# Patient Record
Sex: FEMALE | Race: WHITE | NOT HISPANIC OR LATINO | URBAN - METROPOLITAN AREA
[De-identification: names, ages, dates, MRNs, and addresses within clinical notes are randomized per-mention and may not be internally consistent; named-entity substitution may affect disease eponyms.]

---

## 2018-05-01 ENCOUNTER — EMERGENCY (EMERGENCY)
Facility: HOSPITAL | Age: 73
LOS: 1 days | Discharge: HOME | End: 2018-05-01
Attending: EMERGENCY MEDICINE
Payer: COMMERCIAL

## 2018-05-01 VITALS
HEART RATE: 53 BPM | DIASTOLIC BLOOD PRESSURE: 80 MMHG | HEIGHT: 55 IN | WEIGHT: 182.98 LBS | TEMPERATURE: 98 F | SYSTOLIC BLOOD PRESSURE: 210 MMHG | RESPIRATION RATE: 16 BRPM | OXYGEN SATURATION: 96 %

## 2018-05-01 LAB
APTT BLD: 28 SEC — SIGNIFICANT CHANGE UP (ref 27.5–37.4)
INR BLD: 0.93 RATIO — SIGNIFICANT CHANGE UP (ref 0.88–1.16)
PROTHROM AB SERPL-ACNC: 10.1 SEC — SIGNIFICANT CHANGE UP (ref 9.8–12.7)

## 2018-05-01 PROCEDURE — 99284 EMERGENCY DEPT VISIT MOD MDM: CPT | Mod: 25

## 2018-05-01 PROCEDURE — 85610 PROTHROMBIN TIME: CPT

## 2018-05-01 PROCEDURE — 85027 COMPLETE CBC AUTOMATED: CPT

## 2018-05-01 PROCEDURE — 99285 EMERGENCY DEPT VISIT HI MDM: CPT | Mod: 25

## 2018-05-01 PROCEDURE — 80053 COMPREHEN METABOLIC PANEL: CPT

## 2018-05-01 PROCEDURE — 85730 THROMBOPLASTIN TIME PARTIAL: CPT

## 2018-05-01 PROCEDURE — 71045 X-RAY EXAM CHEST 1 VIEW: CPT

## 2018-05-01 PROCEDURE — 93005 ELECTROCARDIOGRAM TRACING: CPT

## 2018-05-01 PROCEDURE — 84484 ASSAY OF TROPONIN QUANT: CPT

## 2018-05-01 PROCEDURE — 71045 X-RAY EXAM CHEST 1 VIEW: CPT | Mod: 26

## 2018-05-01 RX ORDER — SODIUM CHLORIDE 9 MG/ML
3 INJECTION INTRAMUSCULAR; INTRAVENOUS; SUBCUTANEOUS ONCE
Qty: 0 | Refills: 0 | Status: COMPLETED | OUTPATIENT
Start: 2018-05-01 | End: 2018-05-01

## 2018-05-01 RX ORDER — LABETALOL HCL 100 MG
10 TABLET ORAL ONCE
Qty: 0 | Refills: 0 | Status: DISCONTINUED | OUTPATIENT
Start: 2018-05-01 | End: 2018-05-02

## 2018-05-01 RX ADMIN — SODIUM CHLORIDE 3 MILLILITER(S): 9 INJECTION INTRAMUSCULAR; INTRAVENOUS; SUBCUTANEOUS at 23:35

## 2018-05-01 NOTE — ED PROVIDER NOTE - CROS ED CARDIOVAS ALL NEG
Problem: Pain  Goal: #Acceptable pain level achieved/maintained at rest using NRS/Faces  This goal is used for patients who can self-report.  Acceptable means the level is at or below the identified comfort/function goal.   Outcome: Outcome Not Met, Plan Adjusted  Pt complains of right foot pain. Receives PRN oxycodone q4 hours       - - -

## 2018-05-01 NOTE — ED PROVIDER NOTE - CONDUCTED A DETAILED DISCUSSION WITH PATIENT AND/OR GUARDIAN REGARDING, MDM
lab results/radiology results/need for outpatient follow-up radiology results/lab results/need for outpatient follow-up/return to ED if symptoms worsen, persist or questions arise

## 2018-05-01 NOTE — ED PROVIDER NOTE - OBJECTIVE STATEMENT
74 y/o pt w/ PMHx of HTN, chronic migraines, and vertigo and PSHx of aortic valve replacement presents today with elevated BP and chest pain. Pt reports that her BP has been uncontrolled. Today, pt reported elevated BP in the morning at 220 systolic and later this evening up to 240 systolic during which she felt chest pressure and dizziness. Pt notes that she was seen by her cardiologist yesterday who performed an ECHO in office and changed Losartan to Olmesartan and Spironolactone in addition to metipranolol. However, pt was concerned because due to her change in medication, her BP became quite elevated today, so she came to the ED. Pt currently admits that her dizziness has resolved. Pt denies focal weakness, numbness, or any other complaints. MICHAELDA.

## 2018-05-01 NOTE — ED PROVIDER NOTE - PROGRESS NOTE DETAILS
labs show neg trop, CXR unremarkable  discussed above with patient. On reeval BP improved w/o intervention, pt denies recurrence of chest pain. Patient stable for discharge.

## 2018-05-01 NOTE — ED PROVIDER NOTE - MEDICAL DECISION MAKING DETAILS
74 y/o F pt w/ Hx of uncontrolled HTN, vertigo, and chronic migraines presents w/ elevated BP assoc w/ chest pain. Currently, pt asymptomatic. Will obtain EKG, labs, and CXR, and reassess. Given Labetalol for HTN.

## 2018-05-02 VITALS
SYSTOLIC BLOOD PRESSURE: 152 MMHG | DIASTOLIC BLOOD PRESSURE: 60 MMHG | RESPIRATION RATE: 16 BRPM | OXYGEN SATURATION: 100 % | TEMPERATURE: 98 F | HEART RATE: 57 BPM

## 2018-05-02 LAB
ALBUMIN SERPL ELPH-MCNC: 3.8 G/DL — SIGNIFICANT CHANGE UP (ref 3.5–5)
ALP SERPL-CCNC: 84 U/L — SIGNIFICANT CHANGE UP (ref 40–120)
ALT FLD-CCNC: 24 U/L DA — SIGNIFICANT CHANGE UP (ref 10–60)
ANION GAP SERPL CALC-SCNC: 8 MMOL/L — SIGNIFICANT CHANGE UP (ref 5–17)
AST SERPL-CCNC: 25 U/L — SIGNIFICANT CHANGE UP (ref 10–40)
BASOPHILS # BLD AUTO: 0.1 K/UL — SIGNIFICANT CHANGE UP (ref 0–0.2)
BASOPHILS NFR BLD AUTO: 1.2 % — SIGNIFICANT CHANGE UP (ref 0–2)
BILIRUB SERPL-MCNC: 0.3 MG/DL — SIGNIFICANT CHANGE UP (ref 0.2–1.2)
BUN SERPL-MCNC: 19 MG/DL — HIGH (ref 7–18)
CALCIUM SERPL-MCNC: 10.2 MG/DL — SIGNIFICANT CHANGE UP (ref 8.4–10.5)
CHLORIDE SERPL-SCNC: 109 MMOL/L — HIGH (ref 96–108)
CO2 SERPL-SCNC: 24 MMOL/L — SIGNIFICANT CHANGE UP (ref 22–31)
CREAT SERPL-MCNC: 0.67 MG/DL — SIGNIFICANT CHANGE UP (ref 0.5–1.3)
EOSINOPHIL # BLD AUTO: 0.2 K/UL — SIGNIFICANT CHANGE UP (ref 0–0.5)
EOSINOPHIL NFR BLD AUTO: 2.8 % — SIGNIFICANT CHANGE UP (ref 0–6)
GLUCOSE SERPL-MCNC: 93 MG/DL — SIGNIFICANT CHANGE UP (ref 70–99)
HCT VFR BLD CALC: 40.7 % — SIGNIFICANT CHANGE UP (ref 34.5–45)
HGB BLD-MCNC: 13 G/DL — SIGNIFICANT CHANGE UP (ref 11.5–15.5)
LYMPHOCYTES # BLD AUTO: 1.9 K/UL — SIGNIFICANT CHANGE UP (ref 1–3.3)
LYMPHOCYTES # BLD AUTO: 25.9 % — SIGNIFICANT CHANGE UP (ref 13–44)
MCHC RBC-ENTMCNC: 25.7 PG — LOW (ref 27–34)
MCHC RBC-ENTMCNC: 32 GM/DL — SIGNIFICANT CHANGE UP (ref 32–36)
MCV RBC AUTO: 80.5 FL — SIGNIFICANT CHANGE UP (ref 80–100)
MONOCYTES # BLD AUTO: 0.5 K/UL — SIGNIFICANT CHANGE UP (ref 0–0.9)
MONOCYTES NFR BLD AUTO: 7.4 % — SIGNIFICANT CHANGE UP (ref 2–14)
NEUTROPHILS # BLD AUTO: 4.7 K/UL — SIGNIFICANT CHANGE UP (ref 1.8–7.4)
NEUTROPHILS NFR BLD AUTO: 62.8 % — SIGNIFICANT CHANGE UP (ref 43–77)
PLATELET # BLD AUTO: 184 K/UL — SIGNIFICANT CHANGE UP (ref 150–400)
POTASSIUM SERPL-MCNC: 4 MMOL/L — SIGNIFICANT CHANGE UP (ref 3.5–5.3)
POTASSIUM SERPL-SCNC: 4 MMOL/L — SIGNIFICANT CHANGE UP (ref 3.5–5.3)
PROT SERPL-MCNC: 7.5 G/DL — SIGNIFICANT CHANGE UP (ref 6–8.3)
RBC # BLD: 5.05 M/UL — SIGNIFICANT CHANGE UP (ref 3.8–5.2)
RBC # FLD: 14.1 % — SIGNIFICANT CHANGE UP (ref 10.3–14.5)
SODIUM SERPL-SCNC: 141 MMOL/L — SIGNIFICANT CHANGE UP (ref 135–145)
TROPONIN I SERPL-MCNC: <0.015 NG/ML — SIGNIFICANT CHANGE UP (ref 0–0.04)
WBC # BLD: 7.4 K/UL — SIGNIFICANT CHANGE UP (ref 3.8–10.5)
WBC # FLD AUTO: 7.4 K/UL — SIGNIFICANT CHANGE UP (ref 3.8–10.5)

## 2018-05-02 NOTE — ED ADULT NURSE NOTE - OBJECTIVE STATEMENT
came to Ed due to high blood pressure today with dizziness which resolved by itself, denies any headache or dizziness at this time.

## 2020-07-18 NOTE — ED ADULT NURSE NOTE - PLAN OF CARE
[facility]

                             Created on: 2020



Romy Leida

External Reference #: [id number]

: 1992

Sex: Female



Demographics





                          Address                   [address] [place]  [address]

 

                          Preferred Language        Unknown

 

                          Marital Status            Unknown

 

                          Pentecostalism Affiliation     Unknown

 

                          Race                      Unknown

 

                          Ethnic Group              Unknown





Author





                          Author                    Leida ALBA

 

                          Organization              [facility]

 

                          Address                   [address] [place]  [address]



 

                          Phone                     [phone]







Care Team Providers





                    Care Team Member Name Role                Phone

 

                    MACRINA ALBA     Unavailable         [phone]







PROBLEMS





          Type      Condition ICD9-CM Code FKX71-WB Code Onset Dates Condition S

tatus SNOMED 

Code

 

             Problem      Encounter for insertion of intrauterine contraceptive 

device              Z30.430       

                          Active                    51009068

 

          Problem   Microcytic anemia           D50.9               Active    23

[id number]

 

          Problem   Itching             L29.9               Active    065196162

 

          Problem   Bug bites           W57.XXXA            Active    [id number]







ALLERGIES

No Information



ENCOUNTERS





                Encounter       Location        Date            Diagnosis

 

                          [facility]  [id number]

[address] [place] [address] 

[id number]                              Viral upper respiratory trac

t infection J06.9

 

                          [facility]  [id number]

[address] [place] [address] 

[id number]                31 Oct, 2018               

 

                          [facility]     [id number]

[address] [place] [address]

                          24 Oct, 2018              Prenatal care in second trim

[place] Z34.92

 

                          [facility]     [id number]

[address] [place] [address]

                          19 Oct, 2018              Microcytic anemia D50.9

 

                          [facility]     [address]

[address] [place] [address]

                          03 Oct, 2018              Microcytic anemia D50.9

 

                          [facility]     [id number]

[address] [place] [address]

                          26 Sep, 2018              Multigravida in first trimes

ter Z34.81 and Normal pregnancy in 

multigravida Z34.80

 

                          [facility]     [id number]

[address] [place] [address]

                          20 Sep, 2018               

 

                          [facility]     [address] [id number]

[address] [place] [address]

                          14 Sep, 2018               

 

                          [facility]     [address] [facility] [id number]

[address] [place] [address]

                          11 Sep, 2018               

 

                          [facility]     [address] [facility] [id number]

[address] [place] [address]

                          07 Sep, 2018              Encounter for pregnancy test

 Z32.00

 

                          [facility]  [address] [facility] [id number]

[address] [place] [address] 

[address]                              Irritant contact dermatitis 

due to other chemical 

products L24.5

 

                          [facility]     [address] [id number]

[address] [place] [address]

                                        Frequent headaches R51

 

                          [facility]  [address] [facility] [id number]

[address] [place] [address] 

[address]                              Frequent headaches R51

 

                          [facility]  [facility] N [facility] [id number]

[address] [place] [address] 

[address]                              Other viral agents as the ca

use of diseases classified 

elsewhere B97.89 and Acute upper respiratory infection, unspecified J06.9

 

                          [address] [facility] [id number]

[address] [place] [address]

                                        Pyelonephritis N12

 

                          [address] [facility] [id number]

[address] [place] [address]

                                        Leukorrhea N89.8 ; Acute vag

initis N76.0 and Other specified 

bacterial agents as the cause of diseases classified elsewhere B96.89

 

                          [facility]     [address] [id number]

[address] [place] [address]

                          29 Mar, 2016              Right ovarian cyst N83.20

 

                          [address] [place] [address] [facility] [id number]

[address] [place] [address]

                                         

 

                          [facility]     [address] [facility] [id number]

[address] [place] [address]

                                         

 

                          [address] [facility] [id number]

[address] [place] [address]

                          13 2016              Right ovarian cyst N83.20

 

                          [address]

[address] [place] [address]

                          11 2016              Encounter for insertion of i

ntrauterine contraceptive device 

Z30.430

 

                          [address] [place] [address]

                          07 2016              Encounter for counseling reg

arding contraception Z30.9

 

                          [address] [address]

                          04 2016               

 

                          [address] [address]

                          18 Dec, 2015              Well woman exam Z01.419 ; We

ight gain R63.5 and BMI 27.0-27.9,adult

Z68.27

 

                          [address] [address]

                          18 Dec, 2015              Erythema nodosum L52 and Fat

igue R53.83

 

                          [address] [address]

                          16 Dec, 2015              Erythema nodosum L52

 

                          [address] [address] [address]

                          30 2015              General counseling and advic

e for contraceptive management Z30.09 

and OCP (oral contraceptive pills) initiation Z30.011

 

                          [address] [place] [address] [address]

                          27 2015              Bug bites W57.XXXA and Itchi

ng L29.9

 

                          [address] [address]

[address] [place] [address]

                          19 2015              Well woman exam Z01.419 ; We

ight gain R63.5 and BMI 27.0-27.9,adult

Z68.27

 

                          [address] [address]

[address] [place] [address]

                          27 Oct, 2015              Ankle pain, left M25.572

 

                          [facility]   [address] [id number]

[address], [place] [id number]

                          12 May, 2015              Dental examination V72.2

 

                          [facility]   [address] [id number]

[address] [place] [id number]

                          08 May, 2015              Dental examination V72.2

 

                          [facility]     [address] [id number]

[address] [place] [address]

                                         

 

                          [facility]     [address] [id number]

[address] [place] [address]

                                         

 

                          [facility]     [address] [id number]

[address] [place] [address]

                                         

 

                          [facility]     [address] [id number]

[address] [place] [address]

                                         

 

                          [facility]     [address] [id number]

[address] [place] [address]

                                         

 

                          [facility]     [address] [id number]

[address] [place] [address]

                                         

 

                          [facility]     [address] [id number]

[address] [place] [address]

                                         

 

                          [facility]     [address] [id number]

[address] [place] [address]

                                         

 

                          [facility]     [address] [id number]

[address] [place] [address]

                          09 Oct, 2014               

 

                          [facility]     [address] [id number]

[address] [place] [address]

                          09 Oct, 2014               

 

                          [facility]     [address] [id number]

[address] [place] [address]

                          29 Sep, 2014               

 

                          [facility]     [address] [id number]

[address] [place] [address]

                          29 Sep, 2014               

 

                          [facility]     [address] [id number]

[address] [place] [address]

                          22 Sep, 2014               

 

                          [facility]     [address] [id number]

[address] [place] [address], [place] [address]

                          22 Sep, 2014               

 

                          [facility]     [address] [id number]

[address] [place] [address], [place] [address]

                          22 Aug, 2014               

 

                          [facility]     [address] [id number]

[address] [place] [address], [place] [address]

                          22 Aug, 2014               

 

                          [facility]     [address] [id number]

[address] [place] [address], [place] [address]

                          19 May, 2014               

 

                          [facility]     [address] [id number]

[address] [place] [address], [place] [address]

                          19 May, 2014               

 

                          [facility]     [address] [id number]

[address] [place] [address], [place] [address]

                          10 Apr, 2014               

 

                          [facility]     [address] [id number]

[address] [place] [address], [place] [address]

                          10 Apr, 2014               

 

                          [facility]     [address] [id number]

[address] [place] [address], [place] [address]

                                         

 

                          [facility]     [address] [id number]

[address] [place] [address], [place] [address]

                                         

 

                          [facility]     [address] [id number]

[address] [place] [address], [place] [address]

                                         

 

                          [facility]     [address] [id number]

[address] [place] [address], [place] [address]

                                         

 

                          [facility]     [address] [id number]

[address] [place] [address], [place] [address]

                          10 Feb, 2014               

 

                          [facility]     [address] [id number]

[address] [place] [address], [place] [address]

                          10 Feb, 2014               

 

                          [facility]     [address] [id number]

[address] [place] [address], [place] [address]

                                         

 

                          [facility]     [address] [id number]

[address] [place] [address], [place] [address]

                                         

 

                          [facility]     [address] [id number]

[address] [place] [address], [place] [address]

                                         

 

                          [facility]     [address] [id number]

[address] [place] [address], [place] [address]

                                         

 

                          [facility]     [address] [id number]

[address] [place] [address], [place] [address]

                                         

 

                          [facility]     [address] [id number]

[address] [place] [address], [place] [address]

                                         

 

                          [facility]     [address] [id number]

[address] [place] [address], [place] [address]

                                         

 

                          [facility]     [address] [id number]

[address] [place] [address], [place] [address]

                                         

 

                          [facility]     [address] [id number]

[address] [place] [address], [place] [address]

                                         

 

                          [facility]     [address] [id number]

[address] [place] [address], [place] [address]

                                         

 

                          [facility]     [address] [id number]

[address] [place] [address], [place] [address]

                                         

 

                          [facility]     [address] [id number]

[address] [place] [address], [place] [address]

                                         

 

                          [facility]     [address] [id number]

[address] [place] [address], [place] [address]

                          28 Mar, 2013               

 

                          [facility]     [address] [id number]

[address] [place] [address], [place] [address]

                          25 Mar, 2013               

 

                          [facility]     [address] [id number]

[address] [place] [address], [place] [address]

                          07 Mar, 2013               

 

                          [facility]     [address] [id number]

[address] [place] [address], [place] [address]

                          04 Mar, 2013               

 

                          [facility]     [address] [id number]

[address] [place] [address], [place] [address]

                          01 Mar, 2013               

 

                          [facility]     [address] [id number]

[address] [place] [address], [place] [address]

                                         

 

                          [facility]     [address] [id number]

[address] [place] [address]

                                         

 

                          [facility]     [address] [facility] [id number]

[address] [place] [address]

                                         

 

                          [facility]     [address] [facility] [id number]

[address] [place] [address]

                          25 Oct, 2012               

 

                          [facility]     [address] [facility] [id number]

[address] [place] [address]

                          25 Oct, 2012               

 

                          [facility]     [address] [facility] [id number]

[address] [place] [address]

                          03 Oct, 2012               

 

                          [facility]     [address] [facility] [id number]

[address] [place] [address]

                                         

 

                          [facility]     [address] [facility] [id number]

[address] [place] [address]

                                         







IMMUNIZATIONS

No Known Immunizations



SOCIAL HISTORY

Never Assessed



REASON FOR VISIT





PLAN OF CARE





VITAL SIGNS





MEDICATIONS

No Known Medications



RESULTS

No Results



PROCEDURES

No Known procedures



INSTRUCTIONS





MEDICATIONS ADMINISTERED

No Known Medications



MEDICAL (GENERAL) HISTORY





                    Type                Description         Date

 

                    Surgical History    No know Surgical history  

 

                    Hospitalization History childbirth          

 

                    Hospitalization History childbirth          2019 Explanation of exam/test

## 2021-01-01 ENCOUNTER — INPATIENT (INPATIENT)
Facility: HOSPITAL | Age: 76
LOS: 12 days | End: 2021-03-25
Attending: HOSPITALIST | Admitting: HOSPITALIST
Payer: MEDICARE

## 2021-01-01 VITALS
DIASTOLIC BLOOD PRESSURE: 38 MMHG | HEART RATE: 66 BPM | TEMPERATURE: 95 F | RESPIRATION RATE: 18 BRPM | OXYGEN SATURATION: 68 % | SYSTOLIC BLOOD PRESSURE: 54 MMHG

## 2021-01-01 VITALS
HEIGHT: 55 IN | SYSTOLIC BLOOD PRESSURE: 121 MMHG | DIASTOLIC BLOOD PRESSURE: 63 MMHG | TEMPERATURE: 98 F | OXYGEN SATURATION: 85 % | RESPIRATION RATE: 20 BRPM | HEART RATE: 90 BPM

## 2021-01-01 DIAGNOSIS — U07.1 COVID-19: ICD-10-CM

## 2021-01-01 DIAGNOSIS — Z71.89 OTHER SPECIFIED COUNSELING: ICD-10-CM

## 2021-01-01 DIAGNOSIS — K59.00 CONSTIPATION, UNSPECIFIED: ICD-10-CM

## 2021-01-01 DIAGNOSIS — I35.0 NONRHEUMATIC AORTIC (VALVE) STENOSIS: ICD-10-CM

## 2021-01-01 DIAGNOSIS — R60.0 LOCALIZED EDEMA: ICD-10-CM

## 2021-01-01 DIAGNOSIS — Z98.890 OTHER SPECIFIED POSTPROCEDURAL STATES: Chronic | ICD-10-CM

## 2021-01-01 DIAGNOSIS — N17.9 ACUTE KIDNEY FAILURE, UNSPECIFIED: ICD-10-CM

## 2021-01-01 DIAGNOSIS — J96.01 ACUTE RESPIRATORY FAILURE WITH HYPOXIA: ICD-10-CM

## 2021-01-01 DIAGNOSIS — Z29.9 ENCOUNTER FOR PROPHYLACTIC MEASURES, UNSPECIFIED: ICD-10-CM

## 2021-01-01 DIAGNOSIS — F32.5 MAJOR DEPRESSIVE DISORDER, SINGLE EPISODE, IN FULL REMISSION: ICD-10-CM

## 2021-01-01 DIAGNOSIS — Z51.5 ENCOUNTER FOR PALLIATIVE CARE: ICD-10-CM

## 2021-01-01 DIAGNOSIS — I10 ESSENTIAL (PRIMARY) HYPERTENSION: ICD-10-CM

## 2021-01-01 DIAGNOSIS — Z90.49 ACQUIRED ABSENCE OF OTHER SPECIFIED PARTS OF DIGESTIVE TRACT: Chronic | ICD-10-CM

## 2021-01-01 DIAGNOSIS — J96.00 ACUTE RESPIRATORY FAILURE, UNSPECIFIED WHETHER WITH HYPOXIA OR HYPERCAPNIA: ICD-10-CM

## 2021-01-01 LAB
ALBUMIN SERPL ELPH-MCNC: 2.9 G/DL — LOW (ref 3.3–5)
ALBUMIN SERPL ELPH-MCNC: 3.4 G/DL — SIGNIFICANT CHANGE UP (ref 3.3–5)
ALBUMIN SERPL ELPH-MCNC: 3.5 G/DL — SIGNIFICANT CHANGE UP (ref 3.3–5)
ALBUMIN SERPL ELPH-MCNC: 3.9 G/DL — SIGNIFICANT CHANGE UP (ref 3.3–5)
ALBUMIN SERPL ELPH-MCNC: 4.3 G/DL — SIGNIFICANT CHANGE UP (ref 3.3–5)
ALBUMIN SERPL ELPH-MCNC: 4.3 G/DL — SIGNIFICANT CHANGE UP (ref 3.3–5)
ALP SERPL-CCNC: 110 U/L — SIGNIFICANT CHANGE UP (ref 40–120)
ALP SERPL-CCNC: 64 U/L — SIGNIFICANT CHANGE UP (ref 40–120)
ALP SERPL-CCNC: 64 U/L — SIGNIFICANT CHANGE UP (ref 40–120)
ALP SERPL-CCNC: 76 U/L — SIGNIFICANT CHANGE UP (ref 40–120)
ALP SERPL-CCNC: 85 U/L — SIGNIFICANT CHANGE UP (ref 40–120)
ALP SERPL-CCNC: 92 U/L — SIGNIFICANT CHANGE UP (ref 40–120)
ALT FLD-CCNC: 11 U/L — SIGNIFICANT CHANGE UP (ref 4–33)
ALT FLD-CCNC: 11 U/L — SIGNIFICANT CHANGE UP (ref 4–33)
ALT FLD-CCNC: 13 U/L — SIGNIFICANT CHANGE UP (ref 4–33)
ALT FLD-CCNC: 17 U/L — SIGNIFICANT CHANGE UP (ref 4–33)
ALT FLD-CCNC: 17 U/L — SIGNIFICANT CHANGE UP (ref 4–33)
ALT FLD-CCNC: 18 U/L — SIGNIFICANT CHANGE UP (ref 4–33)
ANION GAP SERPL CALC-SCNC: 10 MMOL/L — SIGNIFICANT CHANGE UP (ref 7–14)
ANION GAP SERPL CALC-SCNC: 10 MMOL/L — SIGNIFICANT CHANGE UP (ref 7–14)
ANION GAP SERPL CALC-SCNC: 11 MMOL/L — SIGNIFICANT CHANGE UP (ref 7–14)
ANION GAP SERPL CALC-SCNC: 11 MMOL/L — SIGNIFICANT CHANGE UP (ref 7–14)
ANION GAP SERPL CALC-SCNC: 12 MMOL/L — SIGNIFICANT CHANGE UP (ref 7–14)
ANION GAP SERPL CALC-SCNC: 13 MMOL/L — SIGNIFICANT CHANGE UP (ref 7–14)
ANION GAP SERPL CALC-SCNC: 14 MMOL/L — SIGNIFICANT CHANGE UP (ref 7–14)
ANION GAP SERPL CALC-SCNC: 14 MMOL/L — SIGNIFICANT CHANGE UP (ref 7–14)
ANION GAP SERPL CALC-SCNC: 16 MMOL/L — HIGH (ref 7–14)
AST SERPL-CCNC: 21 U/L — SIGNIFICANT CHANGE UP (ref 4–32)
AST SERPL-CCNC: 23 U/L — SIGNIFICANT CHANGE UP (ref 4–32)
AST SERPL-CCNC: 28 U/L — SIGNIFICANT CHANGE UP (ref 4–32)
AST SERPL-CCNC: 31 U/L — SIGNIFICANT CHANGE UP (ref 4–32)
AST SERPL-CCNC: 35 U/L — HIGH (ref 4–32)
AST SERPL-CCNC: 37 U/L — HIGH (ref 4–32)
B PERT DNA SPEC QL NAA+PROBE: SIGNIFICANT CHANGE UP
BASOPHILS # BLD AUTO: 0 K/UL — SIGNIFICANT CHANGE UP (ref 0–0.2)
BASOPHILS # BLD AUTO: 0.01 K/UL — SIGNIFICANT CHANGE UP (ref 0–0.2)
BASOPHILS # BLD AUTO: 0.02 K/UL — SIGNIFICANT CHANGE UP (ref 0–0.2)
BASOPHILS NFR BLD AUTO: 0 % — SIGNIFICANT CHANGE UP (ref 0–2)
BASOPHILS NFR BLD AUTO: 0.1 % — SIGNIFICANT CHANGE UP (ref 0–2)
BASOPHILS NFR BLD AUTO: 0.2 % — SIGNIFICANT CHANGE UP (ref 0–2)
BASOPHILS NFR BLD AUTO: 0.3 % — SIGNIFICANT CHANGE UP (ref 0–2)
BASOPHILS NFR BLD AUTO: 0.3 % — SIGNIFICANT CHANGE UP (ref 0–2)
BILIRUB SERPL-MCNC: 0.2 MG/DL — SIGNIFICANT CHANGE UP (ref 0.2–1.2)
BILIRUB SERPL-MCNC: 0.3 MG/DL — SIGNIFICANT CHANGE UP (ref 0.2–1.2)
BILIRUB SERPL-MCNC: 0.6 MG/DL — SIGNIFICANT CHANGE UP (ref 0.2–1.2)
BILIRUB SERPL-MCNC: <0.2 MG/DL — SIGNIFICANT CHANGE UP (ref 0.2–1.2)
BLOOD GAS VENOUS COMPREHENSIVE RESULT: SIGNIFICANT CHANGE UP
BUN SERPL-MCNC: 17 MG/DL — SIGNIFICANT CHANGE UP (ref 7–23)
BUN SERPL-MCNC: 21 MG/DL — SIGNIFICANT CHANGE UP (ref 7–23)
BUN SERPL-MCNC: 23 MG/DL — SIGNIFICANT CHANGE UP (ref 7–23)
BUN SERPL-MCNC: 28 MG/DL — HIGH (ref 7–23)
BUN SERPL-MCNC: 29 MG/DL — HIGH (ref 7–23)
BUN SERPL-MCNC: 31 MG/DL — HIGH (ref 7–23)
BUN SERPL-MCNC: 31 MG/DL — HIGH (ref 7–23)
BUN SERPL-MCNC: 32 MG/DL — HIGH (ref 7–23)
BUN SERPL-MCNC: 46 MG/DL — HIGH (ref 7–23)
BUN SERPL-MCNC: 47 MG/DL — HIGH (ref 7–23)
BUN SERPL-MCNC: 48 MG/DL — HIGH (ref 7–23)
BUN SERPL-MCNC: 55 MG/DL — HIGH (ref 7–23)
C PNEUM DNA SPEC QL NAA+PROBE: SIGNIFICANT CHANGE UP
CALCIUM SERPL-MCNC: 10.1 MG/DL — SIGNIFICANT CHANGE UP (ref 8.4–10.5)
CALCIUM SERPL-MCNC: 10.2 MG/DL — SIGNIFICANT CHANGE UP (ref 8.4–10.5)
CALCIUM SERPL-MCNC: 10.3 MG/DL — SIGNIFICANT CHANGE UP (ref 8.4–10.5)
CALCIUM SERPL-MCNC: 10.4 MG/DL — SIGNIFICANT CHANGE UP (ref 8.4–10.5)
CALCIUM SERPL-MCNC: 9.6 MG/DL — SIGNIFICANT CHANGE UP (ref 8.4–10.5)
CALCIUM SERPL-MCNC: 9.7 MG/DL — SIGNIFICANT CHANGE UP (ref 8.4–10.5)
CALCIUM SERPL-MCNC: 9.7 MG/DL — SIGNIFICANT CHANGE UP (ref 8.4–10.5)
CALCIUM SERPL-MCNC: 9.8 MG/DL — SIGNIFICANT CHANGE UP (ref 8.4–10.5)
CALCIUM SERPL-MCNC: 9.9 MG/DL — SIGNIFICANT CHANGE UP (ref 8.4–10.5)
CALCIUM SERPL-MCNC: 9.9 MG/DL — SIGNIFICANT CHANGE UP (ref 8.4–10.5)
CHLORIDE SERPL-SCNC: 100 MMOL/L — SIGNIFICANT CHANGE UP (ref 98–107)
CHLORIDE SERPL-SCNC: 101 MMOL/L — SIGNIFICANT CHANGE UP (ref 98–107)
CHLORIDE SERPL-SCNC: 104 MMOL/L — SIGNIFICANT CHANGE UP (ref 98–107)
CHLORIDE SERPL-SCNC: 104 MMOL/L — SIGNIFICANT CHANGE UP (ref 98–107)
CHLORIDE SERPL-SCNC: 105 MMOL/L — SIGNIFICANT CHANGE UP (ref 98–107)
CHLORIDE SERPL-SCNC: 95 MMOL/L — LOW (ref 98–107)
CHLORIDE SERPL-SCNC: 99 MMOL/L — SIGNIFICANT CHANGE UP (ref 98–107)
CHLORIDE SERPL-SCNC: 99 MMOL/L — SIGNIFICANT CHANGE UP (ref 98–107)
CO2 SERPL-SCNC: 18 MMOL/L — LOW (ref 22–31)
CO2 SERPL-SCNC: 21 MMOL/L — LOW (ref 22–31)
CO2 SERPL-SCNC: 22 MMOL/L — SIGNIFICANT CHANGE UP (ref 22–31)
CO2 SERPL-SCNC: 22 MMOL/L — SIGNIFICANT CHANGE UP (ref 22–31)
CO2 SERPL-SCNC: 23 MMOL/L — SIGNIFICANT CHANGE UP (ref 22–31)
CO2 SERPL-SCNC: 24 MMOL/L — SIGNIFICANT CHANGE UP (ref 22–31)
CO2 SERPL-SCNC: 24 MMOL/L — SIGNIFICANT CHANGE UP (ref 22–31)
CO2 SERPL-SCNC: 25 MMOL/L — SIGNIFICANT CHANGE UP (ref 22–31)
CO2 SERPL-SCNC: 26 MMOL/L — SIGNIFICANT CHANGE UP (ref 22–31)
CO2 SERPL-SCNC: 27 MMOL/L — SIGNIFICANT CHANGE UP (ref 22–31)
CREAT SERPL-MCNC: 0.71 MG/DL — SIGNIFICANT CHANGE UP (ref 0.5–1.3)
CREAT SERPL-MCNC: 0.76 MG/DL — SIGNIFICANT CHANGE UP (ref 0.5–1.3)
CREAT SERPL-MCNC: 0.81 MG/DL — SIGNIFICANT CHANGE UP (ref 0.5–1.3)
CREAT SERPL-MCNC: 0.82 MG/DL — SIGNIFICANT CHANGE UP (ref 0.5–1.3)
CREAT SERPL-MCNC: 0.88 MG/DL — SIGNIFICANT CHANGE UP (ref 0.5–1.3)
CREAT SERPL-MCNC: 0.89 MG/DL — SIGNIFICANT CHANGE UP (ref 0.5–1.3)
CREAT SERPL-MCNC: 0.91 MG/DL — SIGNIFICANT CHANGE UP (ref 0.5–1.3)
CREAT SERPL-MCNC: 0.95 MG/DL — SIGNIFICANT CHANGE UP (ref 0.5–1.3)
CREAT SERPL-MCNC: 1.16 MG/DL — SIGNIFICANT CHANGE UP (ref 0.5–1.3)
CREAT SERPL-MCNC: 1.24 MG/DL — SIGNIFICANT CHANGE UP (ref 0.5–1.3)
CREAT SERPL-MCNC: 1.29 MG/DL — SIGNIFICANT CHANGE UP (ref 0.5–1.3)
CREAT SERPL-MCNC: 1.51 MG/DL — HIGH (ref 0.5–1.3)
CRP SERPL-MCNC: 12.2 MG/L — HIGH
CRP SERPL-MCNC: 13.4 MG/L — HIGH
CRP SERPL-MCNC: 20.9 MG/L — HIGH
CRP SERPL-MCNC: 59.6 MG/L — HIGH
D DIMER BLD IA.RAPID-MCNC: 1309 NG/ML DDU — HIGH
D DIMER BLD IA.RAPID-MCNC: 193 NG/ML DDU — SIGNIFICANT CHANGE UP
D DIMER BLD IA.RAPID-MCNC: 206 NG/ML DDU — SIGNIFICANT CHANGE UP
D DIMER BLD IA.RAPID-MCNC: 315 NG/ML DDU — HIGH
D DIMER BLD IA.RAPID-MCNC: 364 NG/ML DDU — HIGH
EOSINOPHIL # BLD AUTO: 0 K/UL — SIGNIFICANT CHANGE UP (ref 0–0.5)
EOSINOPHIL # BLD AUTO: 0.01 K/UL — SIGNIFICANT CHANGE UP (ref 0–0.5)
EOSINOPHIL # BLD AUTO: 0.02 K/UL — SIGNIFICANT CHANGE UP (ref 0–0.5)
EOSINOPHIL NFR BLD AUTO: 0 % — SIGNIFICANT CHANGE UP (ref 0–6)
EOSINOPHIL NFR BLD AUTO: 0.1 % — SIGNIFICANT CHANGE UP (ref 0–6)
EOSINOPHIL NFR BLD AUTO: 0.3 % — SIGNIFICANT CHANGE UP (ref 0–6)
FERRITIN SERPL-MCNC: 396 NG/ML — HIGH (ref 15–150)
FERRITIN SERPL-MCNC: 427 NG/ML — HIGH (ref 15–150)
FERRITIN SERPL-MCNC: 429 NG/ML — HIGH (ref 15–150)
FERRITIN SERPL-MCNC: 517 NG/ML — HIGH (ref 15–150)
FERRITIN SERPL-MCNC: 530 NG/ML — HIGH (ref 15–150)
FLUAV SUBTYP SPEC NAA+PROBE: SIGNIFICANT CHANGE UP
FLUBV RNA SPEC QL NAA+PROBE: SIGNIFICANT CHANGE UP
GLUCOSE BLDC GLUCOMTR-MCNC: 103 MG/DL — HIGH (ref 70–99)
GLUCOSE BLDC GLUCOMTR-MCNC: 131 MG/DL — HIGH (ref 70–99)
GLUCOSE BLDC GLUCOMTR-MCNC: 283 MG/DL — HIGH (ref 70–99)
GLUCOSE BLDC GLUCOMTR-MCNC: 79 MG/DL — SIGNIFICANT CHANGE UP (ref 70–99)
GLUCOSE BLDC GLUCOMTR-MCNC: 89 MG/DL — SIGNIFICANT CHANGE UP (ref 70–99)
GLUCOSE BLDC GLUCOMTR-MCNC: 96 MG/DL — SIGNIFICANT CHANGE UP (ref 70–99)
GLUCOSE SERPL-MCNC: 109 MG/DL — HIGH (ref 70–99)
GLUCOSE SERPL-MCNC: 122 MG/DL — HIGH (ref 70–99)
GLUCOSE SERPL-MCNC: 128 MG/DL — HIGH (ref 70–99)
GLUCOSE SERPL-MCNC: 144 MG/DL — HIGH (ref 70–99)
GLUCOSE SERPL-MCNC: 185 MG/DL — HIGH (ref 70–99)
GLUCOSE SERPL-MCNC: 81 MG/DL — SIGNIFICANT CHANGE UP (ref 70–99)
GLUCOSE SERPL-MCNC: 81 MG/DL — SIGNIFICANT CHANGE UP (ref 70–99)
GLUCOSE SERPL-MCNC: 86 MG/DL — SIGNIFICANT CHANGE UP (ref 70–99)
GLUCOSE SERPL-MCNC: 88 MG/DL — SIGNIFICANT CHANGE UP (ref 70–99)
GLUCOSE SERPL-MCNC: 96 MG/DL — SIGNIFICANT CHANGE UP (ref 70–99)
GLUCOSE SERPL-MCNC: 97 MG/DL — SIGNIFICANT CHANGE UP (ref 70–99)
GLUCOSE SERPL-MCNC: 99 MG/DL — SIGNIFICANT CHANGE UP (ref 70–99)
HADV DNA SPEC QL NAA+PROBE: SIGNIFICANT CHANGE UP
HCOV 229E RNA SPEC QL NAA+PROBE: SIGNIFICANT CHANGE UP
HCOV HKU1 RNA SPEC QL NAA+PROBE: SIGNIFICANT CHANGE UP
HCOV NL63 RNA SPEC QL NAA+PROBE: SIGNIFICANT CHANGE UP
HCOV OC43 RNA SPEC QL NAA+PROBE: SIGNIFICANT CHANGE UP
HCT VFR BLD CALC: 40 % — SIGNIFICANT CHANGE UP (ref 34.5–45)
HCT VFR BLD CALC: 40 % — SIGNIFICANT CHANGE UP (ref 34.5–45)
HCT VFR BLD CALC: 40.4 % — SIGNIFICANT CHANGE UP (ref 34.5–45)
HCT VFR BLD CALC: 40.7 % — SIGNIFICANT CHANGE UP (ref 34.5–45)
HCT VFR BLD CALC: 40.7 % — SIGNIFICANT CHANGE UP (ref 34.5–45)
HCT VFR BLD CALC: 42 % — SIGNIFICANT CHANGE UP (ref 34.5–45)
HCT VFR BLD CALC: 42.6 % — SIGNIFICANT CHANGE UP (ref 34.5–45)
HCT VFR BLD CALC: 42.9 % — SIGNIFICANT CHANGE UP (ref 34.5–45)
HCT VFR BLD CALC: 43.2 % — SIGNIFICANT CHANGE UP (ref 34.5–45)
HCT VFR BLD CALC: 43.5 % — SIGNIFICANT CHANGE UP (ref 34.5–45)
HGB BLD-MCNC: 12 G/DL — SIGNIFICANT CHANGE UP (ref 11.5–15.5)
HGB BLD-MCNC: 12.1 G/DL — SIGNIFICANT CHANGE UP (ref 11.5–15.5)
HGB BLD-MCNC: 12.3 G/DL — SIGNIFICANT CHANGE UP (ref 11.5–15.5)
HGB BLD-MCNC: 12.3 G/DL — SIGNIFICANT CHANGE UP (ref 11.5–15.5)
HGB BLD-MCNC: 12.5 G/DL — SIGNIFICANT CHANGE UP (ref 11.5–15.5)
HGB BLD-MCNC: 12.7 G/DL — SIGNIFICANT CHANGE UP (ref 11.5–15.5)
HGB BLD-MCNC: 12.8 G/DL — SIGNIFICANT CHANGE UP (ref 11.5–15.5)
HGB BLD-MCNC: 13 G/DL — SIGNIFICANT CHANGE UP (ref 11.5–15.5)
HMPV RNA SPEC QL NAA+PROBE: SIGNIFICANT CHANGE UP
HPIV1 RNA SPEC QL NAA+PROBE: SIGNIFICANT CHANGE UP
HPIV2 RNA SPEC QL NAA+PROBE: SIGNIFICANT CHANGE UP
HPIV3 RNA SPEC QL NAA+PROBE: SIGNIFICANT CHANGE UP
HPIV4 RNA SPEC QL NAA+PROBE: SIGNIFICANT CHANGE UP
IANC: 2.35 K/UL — SIGNIFICANT CHANGE UP (ref 1.5–8.5)
IANC: 3.29 K/UL — SIGNIFICANT CHANGE UP (ref 1.5–8.5)
IANC: 3.84 K/UL — SIGNIFICANT CHANGE UP (ref 1.5–8.5)
IANC: 5.63 K/UL — SIGNIFICANT CHANGE UP (ref 1.5–8.5)
IANC: 5.84 K/UL — SIGNIFICANT CHANGE UP (ref 1.5–8.5)
IMM GRANULOCYTES NFR BLD AUTO: 0.4 % — SIGNIFICANT CHANGE UP (ref 0–1.5)
IMM GRANULOCYTES NFR BLD AUTO: 0.7 % — SIGNIFICANT CHANGE UP (ref 0–1.5)
IMM GRANULOCYTES NFR BLD AUTO: 1 % — SIGNIFICANT CHANGE UP (ref 0–1.5)
IMM GRANULOCYTES NFR BLD AUTO: 1.1 % — SIGNIFICANT CHANGE UP (ref 0–1.5)
IMM GRANULOCYTES NFR BLD AUTO: 3.3 % — HIGH (ref 0–1.5)
LDH SERPL L TO P-CCNC: 359 U/L — HIGH (ref 135–225)
LDH SERPL L TO P-CCNC: 410 U/L — HIGH (ref 135–225)
LDH SERPL L TO P-CCNC: 417 U/L — HIGH (ref 135–225)
LYMPHOCYTES # BLD AUTO: 0.53 K/UL — LOW (ref 1–3.3)
LYMPHOCYTES # BLD AUTO: 0.53 K/UL — LOW (ref 1–3.3)
LYMPHOCYTES # BLD AUTO: 0.57 K/UL — LOW (ref 1–3.3)
LYMPHOCYTES # BLD AUTO: 0.58 K/UL — LOW (ref 1–3.3)
LYMPHOCYTES # BLD AUTO: 0.75 K/UL — LOW (ref 1–3.3)
LYMPHOCYTES # BLD AUTO: 13.6 % — SIGNIFICANT CHANGE UP (ref 13–44)
LYMPHOCYTES # BLD AUTO: 15.5 % — SIGNIFICANT CHANGE UP (ref 13–44)
LYMPHOCYTES # BLD AUTO: 16.4 % — SIGNIFICANT CHANGE UP (ref 13–44)
LYMPHOCYTES # BLD AUTO: 7.4 % — LOW (ref 13–44)
LYMPHOCYTES # BLD AUTO: 7.7 % — LOW (ref 13–44)
MAGNESIUM SERPL-MCNC: 1.8 MG/DL — SIGNIFICANT CHANGE UP (ref 1.6–2.6)
MAGNESIUM SERPL-MCNC: 1.9 MG/DL — SIGNIFICANT CHANGE UP (ref 1.6–2.6)
MAGNESIUM SERPL-MCNC: 2 MG/DL — SIGNIFICANT CHANGE UP (ref 1.6–2.6)
MAGNESIUM SERPL-MCNC: 2.1 MG/DL — SIGNIFICANT CHANGE UP (ref 1.6–2.6)
MAGNESIUM SERPL-MCNC: 2.1 MG/DL — SIGNIFICANT CHANGE UP (ref 1.6–2.6)
MAGNESIUM SERPL-MCNC: 2.2 MG/DL — SIGNIFICANT CHANGE UP (ref 1.6–2.6)
MAGNESIUM SERPL-MCNC: 2.2 MG/DL — SIGNIFICANT CHANGE UP (ref 1.6–2.6)
MAGNESIUM SERPL-MCNC: 2.4 MG/DL — SIGNIFICANT CHANGE UP (ref 1.6–2.6)
MCHC RBC-ENTMCNC: 23.3 PG — LOW (ref 27–34)
MCHC RBC-ENTMCNC: 23.7 PG — LOW (ref 27–34)
MCHC RBC-ENTMCNC: 23.8 PG — LOW (ref 27–34)
MCHC RBC-ENTMCNC: 23.9 PG — LOW (ref 27–34)
MCHC RBC-ENTMCNC: 24 PG — LOW (ref 27–34)
MCHC RBC-ENTMCNC: 24.1 PG — LOW (ref 27–34)
MCHC RBC-ENTMCNC: 29.8 GM/DL — LOW (ref 32–36)
MCHC RBC-ENTMCNC: 29.9 GM/DL — LOW (ref 32–36)
MCHC RBC-ENTMCNC: 30 GM/DL — LOW (ref 32–36)
MCHC RBC-ENTMCNC: 30.1 GM/DL — LOW (ref 32–36)
MCHC RBC-ENTMCNC: 30.2 GM/DL — LOW (ref 32–36)
MCHC RBC-ENTMCNC: 30.3 GM/DL — LOW (ref 32–36)
MCHC RBC-ENTMCNC: 30.3 GM/DL — LOW (ref 32–36)
MCHC RBC-ENTMCNC: 30.4 GM/DL — LOW (ref 32–36)
MCHC RBC-ENTMCNC: 30.5 GM/DL — LOW (ref 32–36)
MCHC RBC-ENTMCNC: 30.7 GM/DL — LOW (ref 32–36)
MCV RBC AUTO: 77.6 FL — LOW (ref 80–100)
MCV RBC AUTO: 78 FL — LOW (ref 80–100)
MCV RBC AUTO: 78.3 FL — LOW (ref 80–100)
MCV RBC AUTO: 78.4 FL — LOW (ref 80–100)
MCV RBC AUTO: 78.7 FL — LOW (ref 80–100)
MCV RBC AUTO: 78.9 FL — LOW (ref 80–100)
MCV RBC AUTO: 79.1 FL — LOW (ref 80–100)
MCV RBC AUTO: 79.6 FL — LOW (ref 80–100)
MCV RBC AUTO: 79.6 FL — LOW (ref 80–100)
MCV RBC AUTO: 79.7 FL — LOW (ref 80–100)
MONOCYTES # BLD AUTO: 0.21 K/UL — SIGNIFICANT CHANGE UP (ref 0–0.9)
MONOCYTES # BLD AUTO: 0.29 K/UL — SIGNIFICANT CHANGE UP (ref 0–0.9)
MONOCYTES # BLD AUTO: 0.55 K/UL — SIGNIFICANT CHANGE UP (ref 0–0.9)
MONOCYTES # BLD AUTO: 0.55 K/UL — SIGNIFICANT CHANGE UP (ref 0–0.9)
MONOCYTES # BLD AUTO: 0.64 K/UL — SIGNIFICANT CHANGE UP (ref 0–0.9)
MONOCYTES NFR BLD AUTO: 15.6 % — HIGH (ref 2–14)
MONOCYTES NFR BLD AUTO: 4.3 % — SIGNIFICANT CHANGE UP (ref 2–14)
MONOCYTES NFR BLD AUTO: 6.9 % — SIGNIFICANT CHANGE UP (ref 2–14)
MONOCYTES NFR BLD AUTO: 7.6 % — SIGNIFICANT CHANGE UP (ref 2–14)
MONOCYTES NFR BLD AUTO: 9.3 % — SIGNIFICANT CHANGE UP (ref 2–14)
NEUTROPHILS # BLD AUTO: 2.35 K/UL — SIGNIFICANT CHANGE UP (ref 1.8–7.4)
NEUTROPHILS # BLD AUTO: 3.29 K/UL — SIGNIFICANT CHANGE UP (ref 1.8–7.4)
NEUTROPHILS # BLD AUTO: 3.84 K/UL — SIGNIFICANT CHANGE UP (ref 1.8–7.4)
NEUTROPHILS # BLD AUTO: 5.63 K/UL — SIGNIFICANT CHANGE UP (ref 1.8–7.4)
NEUTROPHILS # BLD AUTO: 5.84 K/UL — SIGNIFICANT CHANGE UP (ref 1.8–7.4)
NEUTROPHILS NFR BLD AUTO: 66.6 % — SIGNIFICANT CHANGE UP (ref 43–77)
NEUTROPHILS NFR BLD AUTO: 78.8 % — HIGH (ref 43–77)
NEUTROPHILS NFR BLD AUTO: 79.6 % — HIGH (ref 43–77)
NEUTROPHILS NFR BLD AUTO: 81.1 % — HIGH (ref 43–77)
NEUTROPHILS NFR BLD AUTO: 81.8 % — HIGH (ref 43–77)
NRBC # BLD: 0 /100 WBCS — SIGNIFICANT CHANGE UP
NRBC # FLD: 0 K/UL — SIGNIFICANT CHANGE UP
PHOSPHATE SERPL-MCNC: 2.3 MG/DL — LOW (ref 2.5–4.5)
PHOSPHATE SERPL-MCNC: 2.4 MG/DL — LOW (ref 2.5–4.5)
PHOSPHATE SERPL-MCNC: 2.7 MG/DL — SIGNIFICANT CHANGE UP (ref 2.5–4.5)
PHOSPHATE SERPL-MCNC: 2.8 MG/DL — SIGNIFICANT CHANGE UP (ref 2.5–4.5)
PHOSPHATE SERPL-MCNC: 2.9 MG/DL — SIGNIFICANT CHANGE UP (ref 2.5–4.5)
PHOSPHATE SERPL-MCNC: 2.9 MG/DL — SIGNIFICANT CHANGE UP (ref 2.5–4.5)
PHOSPHATE SERPL-MCNC: 3.4 MG/DL — SIGNIFICANT CHANGE UP (ref 2.5–4.5)
PHOSPHATE SERPL-MCNC: 3.8 MG/DL — SIGNIFICANT CHANGE UP (ref 2.5–4.5)
PLATELET # BLD AUTO: 171 K/UL — SIGNIFICANT CHANGE UP (ref 150–400)
PLATELET # BLD AUTO: 204 K/UL — SIGNIFICANT CHANGE UP (ref 150–400)
PLATELET # BLD AUTO: 223 K/UL — SIGNIFICANT CHANGE UP (ref 150–400)
PLATELET # BLD AUTO: 223 K/UL — SIGNIFICANT CHANGE UP (ref 150–400)
PLATELET # BLD AUTO: 242 K/UL — SIGNIFICANT CHANGE UP (ref 150–400)
PLATELET # BLD AUTO: 245 K/UL — SIGNIFICANT CHANGE UP (ref 150–400)
PLATELET # BLD AUTO: 248 K/UL — SIGNIFICANT CHANGE UP (ref 150–400)
PLATELET # BLD AUTO: 249 K/UL — SIGNIFICANT CHANGE UP (ref 150–400)
PLATELET # BLD AUTO: 251 K/UL — SIGNIFICANT CHANGE UP (ref 150–400)
PLATELET # BLD AUTO: 261 K/UL — SIGNIFICANT CHANGE UP (ref 150–400)
POTASSIUM SERPL-MCNC: 3.8 MMOL/L — SIGNIFICANT CHANGE UP (ref 3.5–5.3)
POTASSIUM SERPL-MCNC: 3.8 MMOL/L — SIGNIFICANT CHANGE UP (ref 3.5–5.3)
POTASSIUM SERPL-MCNC: 4.1 MMOL/L — SIGNIFICANT CHANGE UP (ref 3.5–5.3)
POTASSIUM SERPL-MCNC: 4.2 MMOL/L — SIGNIFICANT CHANGE UP (ref 3.5–5.3)
POTASSIUM SERPL-MCNC: 4.6 MMOL/L — SIGNIFICANT CHANGE UP (ref 3.5–5.3)
POTASSIUM SERPL-MCNC: 4.6 MMOL/L — SIGNIFICANT CHANGE UP (ref 3.5–5.3)
POTASSIUM SERPL-MCNC: 4.7 MMOL/L — SIGNIFICANT CHANGE UP (ref 3.5–5.3)
POTASSIUM SERPL-MCNC: 4.9 MMOL/L — SIGNIFICANT CHANGE UP (ref 3.5–5.3)
POTASSIUM SERPL-MCNC: 5 MMOL/L — SIGNIFICANT CHANGE UP (ref 3.5–5.3)
POTASSIUM SERPL-MCNC: 5.1 MMOL/L — SIGNIFICANT CHANGE UP (ref 3.5–5.3)
POTASSIUM SERPL-SCNC: 3.8 MMOL/L — SIGNIFICANT CHANGE UP (ref 3.5–5.3)
POTASSIUM SERPL-SCNC: 3.8 MMOL/L — SIGNIFICANT CHANGE UP (ref 3.5–5.3)
POTASSIUM SERPL-SCNC: 4.1 MMOL/L — SIGNIFICANT CHANGE UP (ref 3.5–5.3)
POTASSIUM SERPL-SCNC: 4.2 MMOL/L — SIGNIFICANT CHANGE UP (ref 3.5–5.3)
POTASSIUM SERPL-SCNC: 4.6 MMOL/L — SIGNIFICANT CHANGE UP (ref 3.5–5.3)
POTASSIUM SERPL-SCNC: 4.6 MMOL/L — SIGNIFICANT CHANGE UP (ref 3.5–5.3)
POTASSIUM SERPL-SCNC: 4.7 MMOL/L — SIGNIFICANT CHANGE UP (ref 3.5–5.3)
POTASSIUM SERPL-SCNC: 4.9 MMOL/L — SIGNIFICANT CHANGE UP (ref 3.5–5.3)
POTASSIUM SERPL-SCNC: 5 MMOL/L — SIGNIFICANT CHANGE UP (ref 3.5–5.3)
POTASSIUM SERPL-SCNC: 5.1 MMOL/L — SIGNIFICANT CHANGE UP (ref 3.5–5.3)
PROCALCITONIN SERPL-MCNC: 0.06 NG/ML — SIGNIFICANT CHANGE UP (ref 0.02–0.1)
PROCALCITONIN SERPL-MCNC: 0.1 NG/ML — SIGNIFICANT CHANGE UP (ref 0.02–0.1)
PROCALCITONIN SERPL-MCNC: 0.13 NG/ML — HIGH (ref 0.02–0.1)
PROT SERPL-MCNC: 6.4 G/DL — SIGNIFICANT CHANGE UP (ref 6–8.3)
PROT SERPL-MCNC: 6.5 G/DL — SIGNIFICANT CHANGE UP (ref 6–8.3)
PROT SERPL-MCNC: 6.7 G/DL — SIGNIFICANT CHANGE UP (ref 6–8.3)
PROT SERPL-MCNC: 7.4 G/DL — SIGNIFICANT CHANGE UP (ref 6–8.3)
PROT SERPL-MCNC: 7.8 G/DL — SIGNIFICANT CHANGE UP (ref 6–8.3)
PROT SERPL-MCNC: 7.8 G/DL — SIGNIFICANT CHANGE UP (ref 6–8.3)
RAPID RVP RESULT: DETECTED
RBC # BLD: 5.06 M/UL — SIGNIFICANT CHANGE UP (ref 3.8–5.2)
RBC # BLD: 5.11 M/UL — SIGNIFICANT CHANGE UP (ref 3.8–5.2)
RBC # BLD: 5.11 M/UL — SIGNIFICANT CHANGE UP (ref 3.8–5.2)
RBC # BLD: 5.15 M/UL — SIGNIFICANT CHANGE UP (ref 3.8–5.2)
RBC # BLD: 5.22 M/UL — HIGH (ref 3.8–5.2)
RBC # BLD: 5.34 M/UL — HIGH (ref 3.8–5.2)
RBC # BLD: 5.35 M/UL — HIGH (ref 3.8–5.2)
RBC # BLD: 5.44 M/UL — HIGH (ref 3.8–5.2)
RBC # BLD: 5.46 M/UL — HIGH (ref 3.8–5.2)
RBC # BLD: 5.57 M/UL — HIGH (ref 3.8–5.2)
RBC # FLD: 15.4 % — HIGH (ref 10.3–14.5)
RBC # FLD: 15.6 % — HIGH (ref 10.3–14.5)
RBC # FLD: 15.7 % — HIGH (ref 10.3–14.5)
RBC # FLD: 15.8 % — HIGH (ref 10.3–14.5)
RBC # FLD: 15.9 % — HIGH (ref 10.3–14.5)
RBC # FLD: 16.3 % — HIGH (ref 10.3–14.5)
RSV RNA SPEC QL NAA+PROBE: SIGNIFICANT CHANGE UP
RV+EV RNA SPEC QL NAA+PROBE: SIGNIFICANT CHANGE UP
SARS-COV-2 RNA SPEC QL NAA+PROBE: DETECTED
SARS-COV-2 RNA SPEC QL NAA+PROBE: DETECTED
SODIUM SERPL-SCNC: 134 MMOL/L — LOW (ref 135–145)
SODIUM SERPL-SCNC: 135 MMOL/L — SIGNIFICANT CHANGE UP (ref 135–145)
SODIUM SERPL-SCNC: 135 MMOL/L — SIGNIFICANT CHANGE UP (ref 135–145)
SODIUM SERPL-SCNC: 136 MMOL/L — SIGNIFICANT CHANGE UP (ref 135–145)
SODIUM SERPL-SCNC: 136 MMOL/L — SIGNIFICANT CHANGE UP (ref 135–145)
SODIUM SERPL-SCNC: 137 MMOL/L — SIGNIFICANT CHANGE UP (ref 135–145)
SODIUM SERPL-SCNC: 138 MMOL/L — SIGNIFICANT CHANGE UP (ref 135–145)
SODIUM SERPL-SCNC: 138 MMOL/L — SIGNIFICANT CHANGE UP (ref 135–145)
SODIUM SERPL-SCNC: 139 MMOL/L — SIGNIFICANT CHANGE UP (ref 135–145)
SODIUM SERPL-SCNC: 141 MMOL/L — SIGNIFICANT CHANGE UP (ref 135–145)
TROPONIN T, HIGH SENSITIVITY RESULT: 23 NG/L — SIGNIFICANT CHANGE UP
TROPONIN T, HIGH SENSITIVITY RESULT: 24 NG/L — SIGNIFICANT CHANGE UP
WBC # BLD: 10.45 K/UL — SIGNIFICANT CHANGE UP (ref 3.8–10.5)
WBC # BLD: 11.3 K/UL — HIGH (ref 3.8–10.5)
WBC # BLD: 11.77 K/UL — HIGH (ref 3.8–10.5)
WBC # BLD: 3.53 K/UL — LOW (ref 3.8–10.5)
WBC # BLD: 4.18 K/UL — SIGNIFICANT CHANGE UP (ref 3.8–10.5)
WBC # BLD: 4.83 K/UL — SIGNIFICANT CHANGE UP (ref 3.8–10.5)
WBC # BLD: 6.43 K/UL — SIGNIFICANT CHANGE UP (ref 3.8–10.5)
WBC # BLD: 6.89 K/UL — SIGNIFICANT CHANGE UP (ref 3.8–10.5)
WBC # BLD: 7.2 K/UL — SIGNIFICANT CHANGE UP (ref 3.8–10.5)
WBC # BLD: 8.32 K/UL — SIGNIFICANT CHANGE UP (ref 3.8–10.5)
WBC # FLD AUTO: 10.45 K/UL — SIGNIFICANT CHANGE UP (ref 3.8–10.5)
WBC # FLD AUTO: 11.3 K/UL — HIGH (ref 3.8–10.5)
WBC # FLD AUTO: 11.77 K/UL — HIGH (ref 3.8–10.5)
WBC # FLD AUTO: 3.53 K/UL — LOW (ref 3.8–10.5)
WBC # FLD AUTO: 4.18 K/UL — SIGNIFICANT CHANGE UP (ref 3.8–10.5)
WBC # FLD AUTO: 4.83 K/UL — SIGNIFICANT CHANGE UP (ref 3.8–10.5)
WBC # FLD AUTO: 6.43 K/UL — SIGNIFICANT CHANGE UP (ref 3.8–10.5)
WBC # FLD AUTO: 6.89 K/UL — SIGNIFICANT CHANGE UP (ref 3.8–10.5)
WBC # FLD AUTO: 7.2 K/UL — SIGNIFICANT CHANGE UP (ref 3.8–10.5)
WBC # FLD AUTO: 8.32 K/UL — SIGNIFICANT CHANGE UP (ref 3.8–10.5)

## 2021-01-01 PROCEDURE — 99233 SBSQ HOSP IP/OBS HIGH 50: CPT | Mod: GC,25

## 2021-01-01 PROCEDURE — 99497 ADVNCD CARE PLAN 30 MIN: CPT | Mod: 25

## 2021-01-01 PROCEDURE — 93970 EXTREMITY STUDY: CPT | Mod: 26

## 2021-01-01 PROCEDURE — 99233 SBSQ HOSP IP/OBS HIGH 50: CPT

## 2021-01-01 PROCEDURE — 99231 SBSQ HOSP IP/OBS SF/LOW 25: CPT

## 2021-01-01 PROCEDURE — 99233 SBSQ HOSP IP/OBS HIGH 50: CPT | Mod: GC

## 2021-01-01 PROCEDURE — 73030 X-RAY EXAM OF SHOULDER: CPT | Mod: 26,RT

## 2021-01-01 PROCEDURE — 99285 EMERGENCY DEPT VISIT HI MDM: CPT

## 2021-01-01 PROCEDURE — 71045 X-RAY EXAM CHEST 1 VIEW: CPT | Mod: 26

## 2021-01-01 PROCEDURE — 99358 PROLONG SERVICE W/O CONTACT: CPT

## 2021-01-01 PROCEDURE — 71275 CT ANGIOGRAPHY CHEST: CPT | Mod: 26

## 2021-01-01 PROCEDURE — 99223 1ST HOSP IP/OBS HIGH 75: CPT

## 2021-01-01 PROCEDURE — 99223 1ST HOSP IP/OBS HIGH 75: CPT | Mod: GC

## 2021-01-01 RX ORDER — QUETIAPINE FUMARATE 200 MG/1
1 TABLET, FILM COATED ORAL
Qty: 0 | Refills: 0 | DISCHARGE

## 2021-01-01 RX ORDER — REMDESIVIR 5 MG/ML
100 INJECTION INTRAVENOUS EVERY 24 HOURS
Refills: 0 | Status: COMPLETED | OUTPATIENT
Start: 2021-01-01 | End: 2021-01-01

## 2021-01-01 RX ORDER — VALSARTAN 80 MG/1
320 TABLET ORAL DAILY
Refills: 0 | Status: DISCONTINUED | OUTPATIENT
Start: 2021-01-01 | End: 2021-01-01

## 2021-01-01 RX ORDER — OXYCODONE HYDROCHLORIDE 5 MG/1
5 TABLET ORAL EVERY 6 HOURS
Refills: 0 | Status: DISCONTINUED | OUTPATIENT
Start: 2021-01-01 | End: 2021-01-01

## 2021-01-01 RX ORDER — CITALOPRAM 10 MG/1
1 TABLET, FILM COATED ORAL
Qty: 0 | Refills: 0 | DISCHARGE

## 2021-01-01 RX ORDER — METOPROLOL TARTRATE 50 MG
1 TABLET ORAL
Qty: 0 | Refills: 0 | DISCHARGE

## 2021-01-01 RX ORDER — OXYCODONE HYDROCHLORIDE 5 MG/1
2.5 TABLET ORAL EVERY 6 HOURS
Refills: 0 | Status: DISCONTINUED | OUTPATIENT
Start: 2021-01-01 | End: 2021-01-01

## 2021-01-01 RX ORDER — ENOXAPARIN SODIUM 100 MG/ML
85 INJECTION SUBCUTANEOUS EVERY 12 HOURS
Refills: 0 | Status: DISCONTINUED | OUTPATIENT
Start: 2021-01-01 | End: 2021-01-01

## 2021-01-01 RX ORDER — FUROSEMIDE 40 MG
1 TABLET ORAL
Qty: 0 | Refills: 0 | DISCHARGE

## 2021-01-01 RX ORDER — ENOXAPARIN SODIUM 100 MG/ML
40 INJECTION SUBCUTANEOUS
Refills: 0 | Status: DISCONTINUED | OUTPATIENT
Start: 2021-01-01 | End: 2021-01-01

## 2021-01-01 RX ORDER — FUROSEMIDE 40 MG
40 TABLET ORAL ONCE
Refills: 0 | Status: DISCONTINUED | OUTPATIENT
Start: 2021-01-01 | End: 2021-01-01

## 2021-01-01 RX ORDER — METOPROLOL TARTRATE 50 MG
50 TABLET ORAL DAILY
Refills: 0 | Status: DISCONTINUED | OUTPATIENT
Start: 2021-01-01 | End: 2021-01-01

## 2021-01-01 RX ORDER — POLYETHYLENE GLYCOL 3350 17 G/17G
17 POWDER, FOR SOLUTION ORAL
Refills: 0 | Status: DISCONTINUED | OUTPATIENT
Start: 2021-01-01 | End: 2021-01-01

## 2021-01-01 RX ORDER — SODIUM CHLORIDE 0.65 %
1 AEROSOL, SPRAY (ML) NASAL EVERY 8 HOURS
Refills: 0 | Status: DISCONTINUED | OUTPATIENT
Start: 2021-01-01 | End: 2021-01-01

## 2021-01-01 RX ORDER — POLYETHYLENE GLYCOL 3350 17 G/17G
17 POWDER, FOR SOLUTION ORAL DAILY
Refills: 0 | Status: DISCONTINUED | OUTPATIENT
Start: 2021-01-01 | End: 2021-01-01

## 2021-01-01 RX ORDER — SODIUM CHLORIDE 9 MG/ML
1000 INJECTION INTRAMUSCULAR; INTRAVENOUS; SUBCUTANEOUS
Refills: 0 | Status: DISCONTINUED | OUTPATIENT
Start: 2021-01-01 | End: 2021-01-01

## 2021-01-01 RX ORDER — ENOXAPARIN SODIUM 100 MG/ML
45 INJECTION SUBCUTANEOUS ONCE
Refills: 0 | Status: DISCONTINUED | OUTPATIENT
Start: 2021-01-01 | End: 2021-01-01

## 2021-01-01 RX ORDER — FUROSEMIDE 40 MG
40 TABLET ORAL DAILY
Refills: 0 | Status: DISCONTINUED | OUTPATIENT
Start: 2021-01-01 | End: 2021-01-01

## 2021-01-01 RX ORDER — ALPRAZOLAM 0.25 MG
1 TABLET ORAL
Qty: 0 | Refills: 0 | DISCHARGE

## 2021-01-01 RX ORDER — ROBINUL 0.2 MG/ML
0.2 INJECTION INTRAMUSCULAR; INTRAVENOUS ONCE
Refills: 0 | Status: COMPLETED | OUTPATIENT
Start: 2021-01-01 | End: 2021-01-01

## 2021-01-01 RX ORDER — MORPHINE SULFATE 50 MG/1
2 CAPSULE, EXTENDED RELEASE ORAL
Refills: 0 | Status: DISCONTINUED | OUTPATIENT
Start: 2021-01-01 | End: 2021-01-01

## 2021-01-01 RX ORDER — ALPRAZOLAM 0.25 MG
0.25 TABLET ORAL THREE TIMES A DAY
Refills: 0 | Status: DISCONTINUED | OUTPATIENT
Start: 2021-01-01 | End: 2021-01-01

## 2021-01-01 RX ORDER — ASPIRIN/CALCIUM CARB/MAGNESIUM 324 MG
81 TABLET ORAL DAILY
Refills: 0 | Status: DISCONTINUED | OUTPATIENT
Start: 2021-01-01 | End: 2021-01-01

## 2021-01-01 RX ORDER — ROSUVASTATIN CALCIUM 5 MG/1
1 TABLET ORAL
Qty: 0 | Refills: 0 | DISCHARGE

## 2021-01-01 RX ORDER — CITALOPRAM 10 MG/1
20 TABLET, FILM COATED ORAL AT BEDTIME
Refills: 0 | Status: DISCONTINUED | OUTPATIENT
Start: 2021-01-01 | End: 2021-01-01

## 2021-01-01 RX ORDER — ONDANSETRON 8 MG/1
4 TABLET, FILM COATED ORAL ONCE
Refills: 0 | Status: COMPLETED | OUTPATIENT
Start: 2021-01-01 | End: 2021-01-01

## 2021-01-01 RX ORDER — SENNA PLUS 8.6 MG/1
2 TABLET ORAL AT BEDTIME
Refills: 0 | Status: DISCONTINUED | OUTPATIENT
Start: 2021-01-01 | End: 2021-01-01

## 2021-01-01 RX ORDER — ENOXAPARIN SODIUM 100 MG/ML
65 INJECTION SUBCUTANEOUS EVERY 12 HOURS
Refills: 0 | Status: DISCONTINUED | OUTPATIENT
Start: 2021-01-01 | End: 2021-01-01

## 2021-01-01 RX ORDER — DEXAMETHASONE 0.5 MG/5ML
6 ELIXIR ORAL DAILY
Refills: 0 | Status: COMPLETED | OUTPATIENT
Start: 2021-01-01 | End: 2021-01-01

## 2021-01-01 RX ORDER — QUETIAPINE FUMARATE 200 MG/1
100 TABLET, FILM COATED ORAL AT BEDTIME
Refills: 0 | Status: DISCONTINUED | OUTPATIENT
Start: 2021-01-01 | End: 2021-01-01

## 2021-01-01 RX ORDER — ASPIRIN/CALCIUM CARB/MAGNESIUM 324 MG
1 TABLET ORAL
Qty: 0 | Refills: 0 | DISCHARGE

## 2021-01-01 RX ORDER — ACETAMINOPHEN 500 MG
650 TABLET ORAL EVERY 6 HOURS
Refills: 0 | Status: DISCONTINUED | OUTPATIENT
Start: 2021-01-01 | End: 2021-01-01

## 2021-01-01 RX ORDER — REMDESIVIR 5 MG/ML
200 INJECTION INTRAVENOUS EVERY 24 HOURS
Refills: 0 | Status: COMPLETED | OUTPATIENT
Start: 2021-01-01 | End: 2021-01-01

## 2021-01-01 RX ORDER — REMDESIVIR 5 MG/ML
INJECTION INTRAVENOUS
Refills: 0 | Status: COMPLETED | OUTPATIENT
Start: 2021-01-01 | End: 2021-01-01

## 2021-01-01 RX ORDER — DEXAMETHASONE 0.5 MG/5ML
6 ELIXIR ORAL ONCE
Refills: 0 | Status: COMPLETED | OUTPATIENT
Start: 2021-01-01 | End: 2021-01-01

## 2021-01-01 RX ORDER — ENOXAPARIN SODIUM 100 MG/ML
25 INJECTION SUBCUTANEOUS ONCE
Refills: 0 | Status: DISCONTINUED | OUTPATIENT
Start: 2021-01-01 | End: 2021-01-01

## 2021-01-01 RX ORDER — MORPHINE SULFATE 50 MG/1
4 CAPSULE, EXTENDED RELEASE ORAL
Qty: 100 | Refills: 0 | Status: DISCONTINUED | OUTPATIENT
Start: 2021-01-01 | End: 2021-01-01

## 2021-01-01 RX ADMIN — POLYETHYLENE GLYCOL 3350 17 GRAM(S): 17 POWDER, FOR SOLUTION ORAL at 00:02

## 2021-01-01 RX ADMIN — ENOXAPARIN SODIUM 40 MILLIGRAM(S): 100 INJECTION SUBCUTANEOUS at 18:46

## 2021-01-01 RX ADMIN — ENOXAPARIN SODIUM 40 MILLIGRAM(S): 100 INJECTION SUBCUTANEOUS at 05:49

## 2021-01-01 RX ADMIN — SENNA PLUS 2 TABLET(S): 8.6 TABLET ORAL at 21:59

## 2021-01-01 RX ADMIN — ENOXAPARIN SODIUM 40 MILLIGRAM(S): 100 INJECTION SUBCUTANEOUS at 18:48

## 2021-01-01 RX ADMIN — Medication 81 MILLIGRAM(S): at 12:00

## 2021-01-01 RX ADMIN — SENNA PLUS 2 TABLET(S): 8.6 TABLET ORAL at 22:36

## 2021-01-01 RX ADMIN — QUETIAPINE FUMARATE 100 MILLIGRAM(S): 200 TABLET, FILM COATED ORAL at 21:47

## 2021-01-01 RX ADMIN — Medication 63.75 MILLIMOLE(S): at 10:16

## 2021-01-01 RX ADMIN — ENOXAPARIN SODIUM 40 MILLIGRAM(S): 100 INJECTION SUBCUTANEOUS at 06:13

## 2021-01-01 RX ADMIN — Medication 50 MILLIGRAM(S): at 05:32

## 2021-01-01 RX ADMIN — Medication 650 MILLIGRAM(S): at 15:14

## 2021-01-01 RX ADMIN — CITALOPRAM 20 MILLIGRAM(S): 10 TABLET, FILM COATED ORAL at 22:47

## 2021-01-01 RX ADMIN — POLYETHYLENE GLYCOL 3350 17 GRAM(S): 17 POWDER, FOR SOLUTION ORAL at 18:48

## 2021-01-01 RX ADMIN — Medication 50 MILLIGRAM(S): at 05:52

## 2021-01-01 RX ADMIN — Medication 40 MILLIGRAM(S): at 05:49

## 2021-01-01 RX ADMIN — Medication 1 MILLIGRAM(S): at 14:30

## 2021-01-01 RX ADMIN — Medication 0.25 MILLIGRAM(S): at 16:42

## 2021-01-01 RX ADMIN — REMDESIVIR 500 MILLIGRAM(S): 5 INJECTION INTRAVENOUS at 02:09

## 2021-01-01 RX ADMIN — VALSARTAN 320 MILLIGRAM(S): 80 TABLET ORAL at 21:58

## 2021-01-01 RX ADMIN — Medication 650 MILLIGRAM(S): at 17:53

## 2021-01-01 RX ADMIN — Medication 650 MILLIGRAM(S): at 18:50

## 2021-01-01 RX ADMIN — Medication 0.25 MILLIGRAM(S): at 21:38

## 2021-01-01 RX ADMIN — Medication 0.25 MILLIGRAM(S): at 14:06

## 2021-01-01 RX ADMIN — Medication 50 MILLIGRAM(S): at 05:12

## 2021-01-01 RX ADMIN — Medication 100 MILLIGRAM(S): at 12:30

## 2021-01-01 RX ADMIN — Medication 650 MILLIGRAM(S): at 12:30

## 2021-01-01 RX ADMIN — Medication 0.25 MILLIGRAM(S): at 14:58

## 2021-01-01 RX ADMIN — CITALOPRAM 20 MILLIGRAM(S): 10 TABLET, FILM COATED ORAL at 21:47

## 2021-01-01 RX ADMIN — ENOXAPARIN SODIUM 40 MILLIGRAM(S): 100 INJECTION SUBCUTANEOUS at 05:10

## 2021-01-01 RX ADMIN — MORPHINE SULFATE 2 MILLIGRAM(S): 50 CAPSULE, EXTENDED RELEASE ORAL at 15:00

## 2021-01-01 RX ADMIN — Medication 50 MILLIGRAM(S): at 06:31

## 2021-01-01 RX ADMIN — REMDESIVIR 500 MILLIGRAM(S): 5 INJECTION INTRAVENOUS at 00:14

## 2021-01-01 RX ADMIN — CITALOPRAM 20 MILLIGRAM(S): 10 TABLET, FILM COATED ORAL at 21:58

## 2021-01-01 RX ADMIN — SENNA PLUS 2 TABLET(S): 8.6 TABLET ORAL at 22:48

## 2021-01-01 RX ADMIN — ENOXAPARIN SODIUM 40 MILLIGRAM(S): 100 INJECTION SUBCUTANEOUS at 16:03

## 2021-01-01 RX ADMIN — CITALOPRAM 20 MILLIGRAM(S): 10 TABLET, FILM COATED ORAL at 21:32

## 2021-01-01 RX ADMIN — REMDESIVIR 500 MILLIGRAM(S): 5 INJECTION INTRAVENOUS at 02:04

## 2021-01-01 RX ADMIN — Medication 1 SPRAY(S): at 14:25

## 2021-01-01 RX ADMIN — ENOXAPARIN SODIUM 40 MILLIGRAM(S): 100 INJECTION SUBCUTANEOUS at 05:42

## 2021-01-01 RX ADMIN — Medication 6 MILLIGRAM(S): at 15:35

## 2021-01-01 RX ADMIN — Medication 0.25 MILLIGRAM(S): at 14:51

## 2021-01-01 RX ADMIN — QUETIAPINE FUMARATE 100 MILLIGRAM(S): 200 TABLET, FILM COATED ORAL at 21:58

## 2021-01-01 RX ADMIN — QUETIAPINE FUMARATE 100 MILLIGRAM(S): 200 TABLET, FILM COATED ORAL at 21:32

## 2021-01-01 RX ADMIN — Medication 10 MILLIGRAM(S): at 00:02

## 2021-01-01 RX ADMIN — Medication 650 MILLIGRAM(S): at 16:45

## 2021-01-01 RX ADMIN — CITALOPRAM 20 MILLIGRAM(S): 10 TABLET, FILM COATED ORAL at 21:57

## 2021-01-01 RX ADMIN — Medication 6 MILLIGRAM(S): at 05:57

## 2021-01-01 RX ADMIN — Medication 0.25 MILLIGRAM(S): at 05:10

## 2021-01-01 RX ADMIN — Medication 81 MILLIGRAM(S): at 12:56

## 2021-01-01 RX ADMIN — Medication 0.25 MILLIGRAM(S): at 07:07

## 2021-01-01 RX ADMIN — Medication 81 MILLIGRAM(S): at 13:07

## 2021-01-01 RX ADMIN — Medication 0.25 MILLIGRAM(S): at 21:23

## 2021-01-01 RX ADMIN — SENNA PLUS 2 TABLET(S): 8.6 TABLET ORAL at 21:53

## 2021-01-01 RX ADMIN — MORPHINE SULFATE 2 MILLIGRAM(S): 50 CAPSULE, EXTENDED RELEASE ORAL at 15:52

## 2021-01-01 RX ADMIN — POLYETHYLENE GLYCOL 3350 17 GRAM(S): 17 POWDER, FOR SOLUTION ORAL at 06:39

## 2021-01-01 RX ADMIN — Medication 650 MILLIGRAM(S): at 12:00

## 2021-01-01 RX ADMIN — ENOXAPARIN SODIUM 40 MILLIGRAM(S): 100 INJECTION SUBCUTANEOUS at 17:38

## 2021-01-01 RX ADMIN — Medication 50 MILLIGRAM(S): at 07:07

## 2021-01-01 RX ADMIN — Medication 0.25 MILLIGRAM(S): at 21:52

## 2021-01-01 RX ADMIN — Medication 0.25 MILLIGRAM(S): at 22:36

## 2021-01-01 RX ADMIN — Medication 81 MILLIGRAM(S): at 14:58

## 2021-01-01 RX ADMIN — ENOXAPARIN SODIUM 40 MILLIGRAM(S): 100 INJECTION SUBCUTANEOUS at 06:39

## 2021-01-01 RX ADMIN — VALSARTAN 320 MILLIGRAM(S): 80 TABLET ORAL at 05:42

## 2021-01-01 RX ADMIN — Medication 650 MILLIGRAM(S): at 18:00

## 2021-01-01 RX ADMIN — VALSARTAN 320 MILLIGRAM(S): 80 TABLET ORAL at 05:53

## 2021-01-01 RX ADMIN — OXYCODONE HYDROCHLORIDE 5 MILLIGRAM(S): 5 TABLET ORAL at 21:58

## 2021-01-01 RX ADMIN — CITALOPRAM 20 MILLIGRAM(S): 10 TABLET, FILM COATED ORAL at 22:48

## 2021-01-01 RX ADMIN — Medication 10 MILLIGRAM(S): at 22:47

## 2021-01-01 RX ADMIN — Medication 6 MILLIGRAM(S): at 06:32

## 2021-01-01 RX ADMIN — ENOXAPARIN SODIUM 40 MILLIGRAM(S): 100 INJECTION SUBCUTANEOUS at 05:39

## 2021-01-01 RX ADMIN — REMDESIVIR 500 MILLIGRAM(S): 5 INJECTION INTRAVENOUS at 02:38

## 2021-01-01 RX ADMIN — Medication 6 MILLIGRAM(S): at 05:10

## 2021-01-01 RX ADMIN — ENOXAPARIN SODIUM 40 MILLIGRAM(S): 100 INJECTION SUBCUTANEOUS at 16:52

## 2021-01-01 RX ADMIN — Medication 50 MILLIGRAM(S): at 05:39

## 2021-01-01 RX ADMIN — Medication 1 SPRAY(S): at 06:46

## 2021-01-01 RX ADMIN — POLYETHYLENE GLYCOL 3350 17 GRAM(S): 17 POWDER, FOR SOLUTION ORAL at 05:13

## 2021-01-01 RX ADMIN — QUETIAPINE FUMARATE 100 MILLIGRAM(S): 200 TABLET, FILM COATED ORAL at 22:48

## 2021-01-01 RX ADMIN — OXYCODONE HYDROCHLORIDE 5 MILLIGRAM(S): 5 TABLET ORAL at 04:03

## 2021-01-01 RX ADMIN — VALSARTAN 320 MILLIGRAM(S): 80 TABLET ORAL at 05:28

## 2021-01-01 RX ADMIN — Medication 81 MILLIGRAM(S): at 11:45

## 2021-01-01 RX ADMIN — QUETIAPINE FUMARATE 100 MILLIGRAM(S): 200 TABLET, FILM COATED ORAL at 21:38

## 2021-01-01 RX ADMIN — Medication 0.25 MILLIGRAM(S): at 06:45

## 2021-01-01 RX ADMIN — SENNA PLUS 2 TABLET(S): 8.6 TABLET ORAL at 21:58

## 2021-01-01 RX ADMIN — ENOXAPARIN SODIUM 40 MILLIGRAM(S): 100 INJECTION SUBCUTANEOUS at 18:00

## 2021-01-01 RX ADMIN — Medication 50 MILLIGRAM(S): at 05:56

## 2021-01-01 RX ADMIN — ENOXAPARIN SODIUM 40 MILLIGRAM(S): 100 INJECTION SUBCUTANEOUS at 18:41

## 2021-01-01 RX ADMIN — VALSARTAN 320 MILLIGRAM(S): 80 TABLET ORAL at 05:12

## 2021-01-01 RX ADMIN — VALSARTAN 320 MILLIGRAM(S): 80 TABLET ORAL at 06:39

## 2021-01-01 RX ADMIN — ENOXAPARIN SODIUM 40 MILLIGRAM(S): 100 INJECTION SUBCUTANEOUS at 18:06

## 2021-01-01 RX ADMIN — ENOXAPARIN SODIUM 40 MILLIGRAM(S): 100 INJECTION SUBCUTANEOUS at 07:26

## 2021-01-01 RX ADMIN — Medication 40 MILLIGRAM(S): at 05:10

## 2021-01-01 RX ADMIN — Medication 650 MILLIGRAM(S): at 00:30

## 2021-01-01 RX ADMIN — Medication 1 MILLIGRAM(S): at 15:29

## 2021-01-01 RX ADMIN — Medication 0.25 MILLIGRAM(S): at 07:32

## 2021-01-01 RX ADMIN — POLYETHYLENE GLYCOL 3350 17 GRAM(S): 17 POWDER, FOR SOLUTION ORAL at 07:26

## 2021-01-01 RX ADMIN — ENOXAPARIN SODIUM 40 MILLIGRAM(S): 100 INJECTION SUBCUTANEOUS at 05:33

## 2021-01-01 RX ADMIN — REMDESIVIR 500 MILLIGRAM(S): 5 INJECTION INTRAVENOUS at 01:58

## 2021-01-01 RX ADMIN — Medication 50 MILLIGRAM(S): at 05:42

## 2021-01-01 RX ADMIN — QUETIAPINE FUMARATE 100 MILLIGRAM(S): 200 TABLET, FILM COATED ORAL at 21:23

## 2021-01-01 RX ADMIN — Medication 0.25 MILLIGRAM(S): at 21:47

## 2021-01-01 RX ADMIN — Medication 650 MILLIGRAM(S): at 21:27

## 2021-01-01 RX ADMIN — CITALOPRAM 20 MILLIGRAM(S): 10 TABLET, FILM COATED ORAL at 21:52

## 2021-01-01 RX ADMIN — Medication 6 MILLIGRAM(S): at 05:13

## 2021-01-01 RX ADMIN — POLYETHYLENE GLYCOL 3350 17 GRAM(S): 17 POWDER, FOR SOLUTION ORAL at 17:44

## 2021-01-01 RX ADMIN — Medication 650 MILLIGRAM(S): at 14:19

## 2021-01-01 RX ADMIN — ROBINUL 0.2 MILLIGRAM(S): 0.2 INJECTION INTRAMUSCULAR; INTRAVENOUS at 15:07

## 2021-01-01 RX ADMIN — ENOXAPARIN SODIUM 40 MILLIGRAM(S): 100 INJECTION SUBCUTANEOUS at 05:56

## 2021-01-01 RX ADMIN — POLYETHYLENE GLYCOL 3350 17 GRAM(S): 17 POWDER, FOR SOLUTION ORAL at 17:38

## 2021-01-01 RX ADMIN — MORPHINE SULFATE 4 MG/HR: 50 CAPSULE, EXTENDED RELEASE ORAL at 18:11

## 2021-01-01 RX ADMIN — Medication 650 MILLIGRAM(S): at 19:37

## 2021-01-01 RX ADMIN — Medication 650 MILLIGRAM(S): at 13:15

## 2021-01-01 RX ADMIN — SENNA PLUS 2 TABLET(S): 8.6 TABLET ORAL at 21:32

## 2021-01-01 RX ADMIN — Medication 0.25 MILLIGRAM(S): at 05:56

## 2021-01-01 RX ADMIN — SODIUM CHLORIDE 75 MILLILITER(S): 9 INJECTION INTRAMUSCULAR; INTRAVENOUS; SUBCUTANEOUS at 18:47

## 2021-01-01 RX ADMIN — Medication 50 MILLIGRAM(S): at 07:26

## 2021-01-01 RX ADMIN — Medication 650 MILLIGRAM(S): at 23:37

## 2021-01-01 RX ADMIN — Medication 650 MILLIGRAM(S): at 16:51

## 2021-01-01 RX ADMIN — Medication 0.25 MILLIGRAM(S): at 21:56

## 2021-01-01 RX ADMIN — Medication 6 MILLIGRAM(S): at 05:39

## 2021-01-01 RX ADMIN — Medication 81 MILLIGRAM(S): at 14:06

## 2021-01-01 RX ADMIN — CITALOPRAM 20 MILLIGRAM(S): 10 TABLET, FILM COATED ORAL at 22:36

## 2021-01-01 RX ADMIN — Medication 6 MILLIGRAM(S): at 05:35

## 2021-01-01 RX ADMIN — Medication 50 MILLIGRAM(S): at 06:13

## 2021-01-01 RX ADMIN — Medication 0.25 MILLIGRAM(S): at 21:58

## 2021-01-01 RX ADMIN — Medication 50 MILLIGRAM(S): at 06:39

## 2021-01-01 RX ADMIN — Medication 6 MILLIGRAM(S): at 07:07

## 2021-01-01 RX ADMIN — SODIUM CHLORIDE 100 MILLILITER(S): 9 INJECTION INTRAMUSCULAR; INTRAVENOUS; SUBCUTANEOUS at 11:19

## 2021-01-01 RX ADMIN — ONDANSETRON 4 MILLIGRAM(S): 8 TABLET, FILM COATED ORAL at 05:10

## 2021-01-01 RX ADMIN — Medication 0.25 MILLIGRAM(S): at 05:47

## 2021-01-01 RX ADMIN — OXYCODONE HYDROCHLORIDE 5 MILLIGRAM(S): 5 TABLET ORAL at 02:09

## 2021-01-01 RX ADMIN — VALSARTAN 320 MILLIGRAM(S): 80 TABLET ORAL at 07:25

## 2021-01-01 RX ADMIN — ENOXAPARIN SODIUM 40 MILLIGRAM(S): 100 INJECTION SUBCUTANEOUS at 17:44

## 2021-01-01 RX ADMIN — MORPHINE SULFATE 2 MILLIGRAM(S): 50 CAPSULE, EXTENDED RELEASE ORAL at 15:14

## 2021-01-01 RX ADMIN — CITALOPRAM 20 MILLIGRAM(S): 10 TABLET, FILM COATED ORAL at 21:38

## 2021-01-01 RX ADMIN — Medication 650 MILLIGRAM(S): at 18:17

## 2021-01-01 RX ADMIN — MORPHINE SULFATE 2 MILLIGRAM(S): 50 CAPSULE, EXTENDED RELEASE ORAL at 14:30

## 2021-01-01 RX ADMIN — Medication 100 MILLIGRAM(S): at 19:37

## 2021-01-01 RX ADMIN — Medication 50 MILLIGRAM(S): at 05:10

## 2021-01-01 RX ADMIN — SENNA PLUS 2 TABLET(S): 8.6 TABLET ORAL at 21:38

## 2021-01-01 RX ADMIN — QUETIAPINE FUMARATE 100 MILLIGRAM(S): 200 TABLET, FILM COATED ORAL at 21:52

## 2021-01-01 RX ADMIN — Medication 0.25 MILLIGRAM(S): at 06:31

## 2021-01-01 RX ADMIN — MORPHINE SULFATE 4 MG/HR: 50 CAPSULE, EXTENDED RELEASE ORAL at 18:30

## 2021-01-01 RX ADMIN — Medication 81 MILLIGRAM(S): at 12:45

## 2021-01-01 RX ADMIN — QUETIAPINE FUMARATE 100 MILLIGRAM(S): 200 TABLET, FILM COATED ORAL at 21:56

## 2021-01-01 RX ADMIN — ENOXAPARIN SODIUM 40 MILLIGRAM(S): 100 INJECTION SUBCUTANEOUS at 06:31

## 2021-01-01 RX ADMIN — Medication 650 MILLIGRAM(S): at 16:01

## 2021-01-01 RX ADMIN — ENOXAPARIN SODIUM 40 MILLIGRAM(S): 100 INJECTION SUBCUTANEOUS at 18:14

## 2021-01-01 RX ADMIN — Medication 650 MILLIGRAM(S): at 23:29

## 2021-01-01 RX ADMIN — Medication 81 MILLIGRAM(S): at 12:07

## 2021-01-01 RX ADMIN — Medication 63.75 MILLIMOLE(S): at 13:07

## 2021-01-01 RX ADMIN — Medication 0.25 MILLIGRAM(S): at 16:02

## 2021-01-01 RX ADMIN — Medication 6 MILLIGRAM(S): at 06:12

## 2021-01-01 RX ADMIN — Medication 6 MILLIGRAM(S): at 05:51

## 2021-01-01 RX ADMIN — QUETIAPINE FUMARATE 100 MILLIGRAM(S): 200 TABLET, FILM COATED ORAL at 22:36

## 2021-01-01 RX ADMIN — VALSARTAN 320 MILLIGRAM(S): 80 TABLET ORAL at 06:31

## 2021-01-01 RX ADMIN — Medication 81 MILLIGRAM(S): at 16:52

## 2021-01-01 RX ADMIN — POLYETHYLENE GLYCOL 3350 17 GRAM(S): 17 POWDER, FOR SOLUTION ORAL at 05:56

## 2021-01-01 RX ADMIN — POLYETHYLENE GLYCOL 3350 17 GRAM(S): 17 POWDER, FOR SOLUTION ORAL at 18:41

## 2021-01-01 RX ADMIN — SENNA PLUS 2 TABLET(S): 8.6 TABLET ORAL at 21:47

## 2021-01-01 RX ADMIN — POLYETHYLENE GLYCOL 3350 17 GRAM(S): 17 POWDER, FOR SOLUTION ORAL at 05:42

## 2021-01-01 RX ADMIN — Medication 81 MILLIGRAM(S): at 14:07

## 2021-01-01 RX ADMIN — OXYCODONE HYDROCHLORIDE 5 MILLIGRAM(S): 5 TABLET ORAL at 20:28

## 2021-01-01 RX ADMIN — Medication 0.25 MILLIGRAM(S): at 10:46

## 2021-01-01 RX ADMIN — ENOXAPARIN SODIUM 40 MILLIGRAM(S): 100 INJECTION SUBCUTANEOUS at 07:10

## 2021-01-01 RX ADMIN — VALSARTAN 320 MILLIGRAM(S): 80 TABLET ORAL at 05:56

## 2021-01-01 RX ADMIN — Medication 81 MILLIGRAM(S): at 12:27

## 2021-01-01 RX ADMIN — CITALOPRAM 20 MILLIGRAM(S): 10 TABLET, FILM COATED ORAL at 21:23

## 2021-01-01 RX ADMIN — Medication 100 MILLIGRAM(S): at 13:07

## 2021-01-01 RX ADMIN — Medication 0.25 MILLIGRAM(S): at 19:55

## 2021-03-12 NOTE — ED PROVIDER NOTE - PHYSICAL EXAMINATION
Attending/Buster: Mild tachypnea; PERRL/EOMI, non-icterus, supple, no VICKEY, no JVD, RRR, mild rhonchi; Abd-soft, NT/ND, no HSM; no LE edema, A&Ox3, nonfocal; Skin-warm/dry  Triage POx 85% on RA; POx 100% on 4 L NC

## 2021-03-12 NOTE — H&P ADULT - HISTORY OF PRESENT ILLNESS
76 year old female [place] speaking, pmhx HTN,hyperlipidemia aortic stenosis s/p aortic valve replacement , MDD, lower leg edema, hx of DVT, obesity    presented due to progressive SOB,non productive cough , myalgia and nausea over 1 week duration. she tested positive for COVID-19 at outpatient urgent care 3/12 was sent to the ER due to hypoxia noted to be on room air 85% in the ER.   was started on supplemental oxygen 4L/min saturation 95%  chest xray with bilateral infiltrates and cardiomegaly    she denies fever or chills, abdominal pain or dysuria , has chronic lower extremity edema  76 year old female [place] speaking, pmhx HTN,hyperlipidemia aortic stenosis s/p aortic valve replacement , MDD, lower leg edema, hx of DVT, obesity    presented due to progressive SOB,non productive cough , myalgia and nausea over 1 week duration. she tested positive for COVID-19 at outpatient urgent care 3/12 was sent to the ER due to hypoxia noted to be on room air 85% in the ER.  known sick contact her daughter , grandson, daughter.  was started on supplemental oxygen 4L/min saturation 95% , bp 121/63mmHg, hr 93 bpm, temp 98.4F  chest xray with bilateral infiltrates and cardiomegaly    she denies fever or chills, abdominal pain or dysuria , has chronic lower extremity edema   ekg sinus rythm with sinus arrythmia, LBB rate 76bpm, QTC 490ms.

## 2021-03-12 NOTE — ED ADULT NURSE NOTE - OBJECTIVE STATEMENT
Pt presents from doctors office for increasing shortness of breath and was told to come in for admission. Pt has been having fevers, shortness of breath, and body aches/pains. Pt denies chest pain, lightheadedness and dizziness. pt sinus rhythm/sinus tachycardia on the monitor. breathing even but labored. Pt placed on 4L NC with an O2 sat of 95%. Pt abd soft and nontender. Pt denies abd pain nausea/vomiting/diarrhea. Skin clean dry and intact. Bilateral lower extremities noted to have swelling. 20g IVL placed in the L and R arms. Will continue to monitor. MD at bedside evaluating patient.

## 2021-03-12 NOTE — ED ADULT TRIAGE NOTE - CHIEF COMPLAINT QUOTE
Pt presents to ED ambulatory from urgent care with c/o worsening shortness of breath and cough x 1 week. Pt tested positive for Covid today. Pt has hx of HTN and heart disease. Pt denies fever, chest pain, abdominal pain, N/V/D. Pt found to be saturating 85% on room air, placed on 4LPM NC now saturating 95%

## 2021-03-12 NOTE — H&P ADULT - NSHPLABSRESULTS_GEN_ALL_CORE
.  LABS:                         12.1   4.83  )-----------( 171      ( 12 Mar 2021 13:17 )             40.0     03-12    134<L>  |  100  |  31<H>  ----------------------------<  128<H>  4.7   |  21<L>  |  1.16    Ca    9.7      12 Mar 2021 13:17    TPro  7.8  /  Alb  4.3  /  TBili  0.2  /  DBili  x   /  AST  35<H>  /  ALT  18  /  AlkPhos  92  03-12                  RADIOLOGY, EKG & ADDITIONAL TESTS: Reviewed.

## 2021-03-12 NOTE — ED PROVIDER NOTE - RESPIRATORY, MLM
rhonchi at the bases, tachypneic but able to speak in full sentences. O2 s rhonchi at the bases, tachypneic but able to speak in full sentences. O2 sat 85% at triage, 99% on 4L NC.

## 2021-03-12 NOTE — ED PROVIDER NOTE - CLINICAL SUMMARY MEDICAL DECISION MAKING FREE TEXT BOX
A/P 77 yo F p/w COVID-related symptoms  -EKG, labs, CXR, Decadron, admit A/P 77 yo F p/w COVID-related symptoms  -EKG, labs, CXR, Decadron, admit    75yo F Hx HTN, AVR presenting with complaints of shortness of breath, myalgias, nonproductive cough over the past 2 weeks. hypoxic at urgent care sent for admission. 85% O2 at triage, improved on 4L NC. will obtaike lab, cxr, ekg, decadron and admit.

## 2021-03-12 NOTE — H&P ADULT - ASSESSMENT
76 year old female Faroese speaking, pmhx HTN,hyperlipidemia, CKD, proteinuria  hx of renal biopsy, aortic stenosis s/p aortic valve porcine replacement 10 years , MDD, lower leg edema, obesity  Dr. Mauro cardiologist.     doug ryan  [phone]      presented due to progressive SOB,non productive cough , myalgia and nausea over 1 week duration. she tested positive for COVID-19 at outpatient urgent care 3/12 was sent to the ER due to hypoxia noted to be on room air 85% in the ER.   was started on supplemental oxygen 4L/min saturation 95% , bp 121/63mmHg, hr 93 bpm, temp 98.4F  chest xray with bilateral infiltrates and cardiomegaly      acute respiratory failure with hypoxia  due to covid pneumonia         plan:  admit to medical floor   started on remdesivir and decadron   monitor inflammatory markers  DVt prophylaxis lovenox 40mg daily     continue home medications   81mg aspirin  metoprolol 50xl daily   seroqual 100mg at night   [id number] 12.5mg once daily   crestor 20mg once daily   alprazolam 0.25mg q6hrs prn, uses only once a daily   lasix 40mg once daily   citalopram 20mg tablet  QHS   meclizine 12.5mg prn dizziness       76 year old female Turkmen speaking, pmhx HTN,hyperlipidemia, CKD, proteinuria  hx of renal biopsy, aortic stenosis s/p aortic valve porcine replacement 10 years , MDD, lower leg edema, obesity  Dr. Mauro cardiologist.     doug ryan  [phone]      presented due to progressive SOB,non productive cough , myalgia and nausea over 1 week duration. she tested positive for COVID-19 at outpatient urgent care 3/12 was sent to the ER due to hypoxia noted to be on room air 85% in the ER.   was started on supplemental oxygen 4L/min saturation 95% , bp 121/63mmHg, hr 93 bpm, temp 98.4F  chest xray with bilateral infiltrates and cardiomegaly      acute respiratory failure with hypoxia  due to covid pneumonia         plan:  admit to medical floor   started on remdesivir and decadron   monitor inflammatory markers  DVt prophylaxis lovenox 40mg daily     continue home medications   81mg aspirin  metoprolol 50xl daily   seroqual 100mg at night   [id number]/, hold HCTZ 12.5mg once daily   crestor 20mg once daily   alprazolam 0.25mg q6hrs prn, uses only once a daily   lasix 40mg once daily   citalopram 20mg tablet  QHS   meclizine 12.5mg prn dizziness

## 2021-03-12 NOTE — H&P ADULT - NSHPPHYSICALEXAM_GEN_ALL_CORE
.  VITAL SIGNS:  T(F): 98.4 (03-12-21 @ 12:33), Max: 98.4 (03-12-21 @ 12:33)  HR: 89 (03-12-21 @ 16:02) (89 - 98)  BP: 157/70 (03-12-21 @ 16:02) (121/63 - 157/70)  BP(mean): --  RR: 28 (03-12-21 @ 16:02) (20 - 28)  SpO2: 99% (03-12-21 @ 16:02) (85% - 99%)    PHYSICAL EXAM:    Constitutional: WDWN resting comfortably in bed; NAD  HEENT: NC/AT, PERRL, EOMI, anicteric sclera, no nasal discharge; uvula midline, no oropharyngeal erythema or exudates; MMM  Neck: supple; no JVD or thyromegaly  Respiratory: diminsihed air entry bilateral, no wheezes  Cardiac: +S1/S2; RRR   Gastrointestinal: soft, NT/ND; no rebound or guarding; +BSx4  Extremities: WWP, no clubbing or cyanosis; peripheral edema +1  Musculoskeletal: NROM x4; no joint swelling, tenderness or erythema  Vascular: 2+ radial, DP/PT pulses B/L  Dermatologic: skin warm, dry and intact; no rashes, wounds, or scars  Lymphatic: no submandibular or cervical LAD  Neurologic: AAOx3; CNII-XII grossly intact; no focal deficits  Psychiatric: affect and characteristics of appearance, verbalizations, behaviors are appropriate, denies SI/HI/AH/VH

## 2021-03-12 NOTE — ED PROVIDER NOTE - OBJECTIVE STATEMENT
Attending/Buster: 77 yo F h/o AVR (?type of valve), HTN, p/w ~one week of nonproductive MA, myalgias, and nausea. Seen and evaluated at an urgent car center today, positive OVID test and referred to the emergency department. Attending/Buster: 75 yo F h/o AVR (?type of valve), HTN, p/w ~one week of nonproductive MA, myalgias, and nausea. Seen and evaluated at an urgent car center today, positive OVID test and referred to the emergency department.    dora smithca pgy2: 75 yo F Hx of AVR, HTN presenting with complaints of flu like symptoms. has had one week of nonproductive cough, progressively worsening sob, myalgias and nausea. no fevers, chills, chest pain, abd pain, vomiting. was seen at an urgent care today and was found to be covid+ with O2 saturations in the 80s. sent to the ED for admission.

## 2021-03-12 NOTE — ED ADULT NURSE NOTE - INTERVENTIONS DEFINITIONS
[place] to call system/Call bell, personal items and telephone within reach/Instruct patient to call for assistance/Room bathroom lighting operational/Non-slip footwear when patient is off stretcher/Physically safe environment: no spills, clutter or unnecessary equipment/Stretcher in lowest position, wheels locked, appropriate side rails in place/Monitor gait and stability/Monitor for mental status changes and reorient to person, place, and time/Review medications for side effects contributing to fall risk/Reinforce activity limits and safety measures with patient and family

## 2021-03-12 NOTE — ED ADULT NURSE NOTE - ED STAT RN HANDOFF DETAILS
Report given to CHRISTY Agrawal. Pt a&ox4. Pt respirations even and unlabored. pt aware of plan of care. VS as noted, will continue to monitor till [place]ansport.

## 2021-03-12 NOTE — ED ADULT NURSE NOTE - HIV OFFER
The ct abd/ pelvis should be \"with contrast\" only. They are calling to get her scheduled if you could change that order. Opt out

## 2021-03-12 NOTE — H&P ADULT - NSICDXPASTMEDICALHX_GEN_ALL_CORE_FT
PAST MEDICAL HISTORY:  Aortic stenosis     Edema, lower extremity     History of aortic valve repair     HTN (hypertension)     Hyperlipidemia     MDD (major depressive disorder)

## 2021-03-13 NOTE — PROGRESS NOTE ADULT - PROBLEM SELECTOR PLAN 2
Hypoxic on admission  - continue with remdesevir/dexamethasone - monitor renal/hepatic function   - monitor inflammatory markers, oxygen sats  - VTE ppx: lovenox 40mg BID

## 2021-03-13 NOTE — PROGRESS NOTE ADULT - ASSESSMENT
76F, Wolof-speaking, with hx of aortic stenosis s/p porcine valve replacement (10 years ago), HTN, HLD, CKD who presents with SOB and cough a/w acute hypoxic respiratory failure 2/2 COVID PNA.

## 2021-03-13 NOTE — PROGRESS NOTE ADULT - SUBJECTIVE AND OBJECTIVE BOX
Anahy Webb MD  [facility] Division of Hospital Medicine  Pager [phone] (M-F 8AM-5PM)  Other Times: [id number]    Patient is a 76y old  Female who presents with a chief complaint of dyspnea (12 Mar 2021 17:30)    SUBJECTIVE / OVERNIGHT EVENTS: patient feeling well, has mild SOB    MEDICATIONS  (STANDING):  aspirin enteric coated 81 milliGRAM(s) Oral daily  citalopram 20 milliGRAM(s) Oral at bedtime  dexAMETHasone  Injectable 6 milliGRAM(s) IV Push daily  enoxaparin Injectable 40 milliGRAM(s) SubCutaneous two times a day  furosemide    Tablet 40 milliGRAM(s) Oral daily  metoprolol succinate ER 50 milliGRAM(s) Oral daily  QUEtiapine 100 milliGRAM(s) Oral at bedtime  remdesivir  IVPB   IV Intermittent   valsartan 320 milliGRAM(s) Oral daily    MEDICATIONS  (PRN):  ALPRAZolam 0.25 milliGRAM(s) Oral three times a day PRN anxiety      PHYSICAL EXAM:  Vital Signs Last 24 Hrs  T(C): 36.7 (13 Mar 2021 09:11), Max: 37.9 (12 Mar 2021 18:51)  T(F): 98 (13 Mar 2021 09:11), Max: 100.3 (12 Mar 2021 18:51)  HR: 71 (13 Mar 2021 09:11) (71 - 86)  BP: 114/69 (13 Mar 2021 09:11) (114/69 - 175/69)  BP(mean): --  RR: 24 (13 Mar 2021 09:12) (22 - 26)  SpO2: 95% (13 Mar 2021 09:12) (82% - 95%)    CONSTITUTIONAL: NAD, well-developed, well-groomed  RESPIRATORY: Normal respiratory effort; lungs are clear to auscultation bilaterally  CARDIOVASCULAR: Regular rate and rhythm, normal S1 and S2, no murmur/rub/gallop; No lower extremity edema  GASTROINTESTINAL: Nontender to palpation, normoactive bowel sounds, no rebound/guarding; No hepatosplenomegaly  MUSCULOSKELETAL:  no clubbing or cyanosis of digits; no joint swelling or tenderness to palpation  NEUROLOGY: non-focal; no gross sensory deficits   PSYCH: A+O to person, place, and time; affect appropriate  SKIN: No rashes; warm     LABS:                        12.1   4.83  )-----------( 171      ( 12 Mar 2021 13:17 )             40.0     03-13    135  |  99  |  28<H>  ----------------------------<  185<H>  5.0   |  22  |  0.88    Ca    10.3      13 Mar 2021 10:59  Phos  2.9     03-13  Mg     1.9     03-13    TPro  7.8  /  Alb  4.3  /  TBili  0.3  /  DBili  x   /  AST  37<H>  /  ALT  17  /  AlkPhos  85  03-13                RADIOLOGY & ADDITIONAL TESTS:  Results Reviewed:   Imaging Personally Reviewed:  Electrocardiogram Personally Reviewed:    COORDINATION OF CARE:  Care Discussed with Consultants/Other Providers [Y/N]:  Prior or Outpatient Records Reviewed [Y/N]:

## 2021-03-13 NOTE — PROGRESS NOTE ADULT - PROBLEM SELECTOR PLAN 1
Hypoxic on admission in setting of COVID infection  - currently on NRB, wean as tolerated  - management of COVID as below  - monitor oxygen saturations closely

## 2021-03-13 NOTE — PROGRESS NOTE ADULT - PROBLEM SELECTOR PLAN 3
Hx of aortic stenosis with porcine valve repair (10 years ago)  - euvolemic on exam  - continue with home lasix

## 2021-03-14 NOTE — PROGRESS NOTE ADULT - PROBLEM SELECTOR PLAN 1
Hypoxic on admission in setting of COVID infection  - escalate from NRB to HFNC, wean as tolerated  - management of COVID as below  - monitor oxygen saturations closely

## 2021-03-14 NOTE — PROGRESS NOTE ADULT - PROBLEM SELECTOR PLAN 3
SCr. 1.5 (baseline 0.6-0.8)  - likely pre-renal, hold lasix/valsartan, gentle IVF hydration   - creatinine clearance >30 - d/w ID fellow Dr. Lisandro plasencia c/w remdesevir for now  - trend BMP, renally dose medications

## 2021-03-14 NOTE — PROGRESS NOTE ADULT - PROBLEM SELECTOR PLAN 4
Hx of aortic stenosis with porcine valve repair (10 years ago)  - euvolemic on exam  - continue to monitor

## 2021-03-14 NOTE — PROGRESS NOTE ADULT - ASSESSMENT
76F, Ukrainian-speaking, with hx of aortic stenosis s/p porcine valve replacement (10 years ago), HTN, HLD, CKD who presents with SOB and cough a/w acute hypoxic respiratory failure 2/2 COVID PNA. Course c/b SKY.

## 2021-03-14 NOTE — PROGRESS NOTE ADULT - PROBLEM SELECTOR PLAN 2
Hypoxic on admission  - continue with remdesevir/dexamethasone - monitor renal/hepatic function   - monitor inflammatory markers [id number], oxygen sats  - VTE ppx: lovenox 40mg BID

## 2021-03-14 NOTE — CHART NOTE - NSCHARTNOTEFT_GEN_A_CORE
Notified by patient's RN that patient was appearing anxious and yelling "rapido, rapido". Evaluated patient at bedside.  Clary # [id number] assisted in history. Patient c/o R-shoulder pain x 1.5 hours. Tenderness was reproducible with palpation over AC joint and posterior shoulder and worse with internal rotation (arm behind body). States Tylenol PO hasn't helped with pain. Patient also asked for sips of water which was given, and patient was helped to sit higher up in bed. These interventions did appear to calm the patient. Will order Oxycodone and order Shoulder XR and continue to monitor. Notified by patient's RN that patient was appearing anxious and yelling "rapido, rapido". Evaluated patient at bedside.  Clary # [id number] assisted in history. Patient c/o R-shoulder pain x 1.5 hours. Tenderness was reproducible with palpation over AC joint and posterior shoulder and worse with internal rotation (arm behind body). States Tylenol PO hasn't helped with pain. Patient also asked for sips of water which was given, and patient was helped to sit higher up in bed. These interventions did appear to calm the patient. Will order Oxycodone and order Shoulder XR and continue to monitor. Attg Dr. Webb notified.

## 2021-03-14 NOTE — PROGRESS NOTE ADULT - SUBJECTIVE AND OBJECTIVE BOX
Anahy Webb MD  [facility] Division of [facility] Medicine  Pager [phone] (M-F 8AM-5PM)  Other Times: [id number]    Patient is a 76y old  Female who presents with a chief complaint of dyspnea (13 Mar 2021 16:58)    SUBJECTIVE / OVERNIGHT EVENTS:    MEDICATIONS  (STANDING):  aspirin enteric coated 81 milliGRAM(s) Oral daily  citalopram 20 milliGRAM(s) Oral at bedtime  dexAMETHasone  Injectable 6 milliGRAM(s) IV Push daily  enoxaparin Injectable 40 milliGRAM(s) SubCutaneous two times a day  metoprolol succinate ER 50 milliGRAM(s) Oral daily  QUEtiapine 100 milliGRAM(s) Oral at bedtime  remdesivir  IVPB   IV Intermittent   remdesivir  IVPB 100 milliGRAM(s) IV Intermittent every 24 hours  sodium chloride 0.9%. 1000 milliLiter(s) (75 mL/Hr) IV Continuous <Continuous>    MEDICATIONS  (PRN):  ALPRAZolam 0.25 milliGRAM(s) Oral three times a day PRN anxiety    PHYSICAL EXAM:  Vital Signs Last 24 Hrs  T(C): 36.8 (14 Mar 2021 06:34), Max: 36.8 (14 Mar 2021 06:34)  T(F): 98.3 (14 Mar 2021 06:34), Max: 98.3 (14 Mar 2021 06:34)  HR: 75 (14 Mar 2021 06:34) (75 - 81)  BP: 109/68 (14 Mar 2021 06:34) (109/68 - 117/68)  RR: 18 (14 Mar 2021 06:34) (18 - 24)  SpO2: 97% (14 Mar 2021 06:34) (95% - 97%)    CONSTITUTIONAL: NAD, well-developed, well-groomed  RESPIRATORY: Normal respiratory effort; lungs are clear to auscultation bilaterally  CARDIOVASCULAR: Regular rate and rhythm, normal S1 and S2, no murmur/rub/gallop; No lower extremity edema  GASTROINTESTINAL: Nontender to palpation, normoactive bowel sounds, no rebound/guarding; No hepatosplenomegaly  MUSCULOSKELETAL:  no clubbing or cyanosis of digits; no joint swelling or tenderness to palpation  NEUROLOGY: non-focal; no gross sensory deficits   PSYCH: A+O to person, place, and time; affect appropriate  SKIN: No rashes; warm     LABS:                        13.0   4.18  )-----------( 242      ( 14 Mar 2021 12:23 )             42.9     03-14    134<L>  |  95<L>  |  48<H>  ----------------------------<  144<H>  4.9   |  23  |  1.51<H>    Ca    10.1      14 Mar 2021 12:23  Phos  2.9     03-13  Mg     1.9     03-13    TPro  7.4  /  Alb  3.9  /  TBili  0.2  /  DBili  x   /  AST  31  /  ALT  17  /  AlkPhos  76  03-14                RADIOLOGY & ADDITIONAL TESTS:  Results Reviewed:   Imaging Personally Reviewed:  Electrocardiogram Personally Reviewed:    COORDINATION OF CARE:  Care Discussed with Consultants/Other Providers [Y/N]:  Prior or Outpatient Records Reviewed [Y/N]:   Anahy Webb MD  [facility] Division of Hospital Medicine  Pager [phone] (M-F 8AM-5PM)  Other Times: [id number]    Patient is a 76y old  Female who presents with a chief complaint of dyspnea (13 Mar 2021 16:58)    SUBJECTIVE / OVERNIGHT EVENTS: patient with mild SOB, denies chest pain, wants to sit up in bed.     MEDICATIONS  (STANDING):  aspirin enteric coated 81 milliGRAM(s) Oral daily  citalopram 20 milliGRAM(s) Oral at bedtime  dexAMETHasone  Injectable 6 milliGRAM(s) IV Push daily  enoxaparin Injectable 40 milliGRAM(s) SubCutaneous two times a day  metoprolol succinate ER 50 milliGRAM(s) Oral daily  QUEtiapine 100 milliGRAM(s) Oral at bedtime  remdesivir  IVPB   IV Intermittent   remdesivir  IVPB 100 milliGRAM(s) IV Intermittent every 24 hours  sodium chloride 0.9%. 1000 milliLiter(s) (75 mL/Hr) IV Continuous <Continuous>    MEDICATIONS  (PRN):  ALPRAZolam 0.25 milliGRAM(s) Oral three times a day PRN anxiety    PHYSICAL EXAM:  Vital Signs Last 24 Hrs  T(C): 36.8 (14 Mar 2021 06:34), Max: 36.8 (14 Mar 2021 06:34)  T(F): 98.3 (14 Mar 2021 06:34), Max: 98.3 (14 Mar 2021 06:34)  HR: 75 (14 Mar 2021 06:34) (75 - 81)  BP: 109/68 (14 Mar 2021 06:34) (109/68 - 117/68)  RR: 18 (14 Mar 2021 06:34) (18 - 24)  SpO2: 97% (14 Mar 2021 06:34) (95% - 97%)    CONSTITUTIONAL: NAD, well-developed, well-groomed  RESPIRATORY: Normal respiratory effort; lungs are clear to auscultation bilaterally  CARDIOVASCULAR: Regular rate and rhythm, normal S1 and S2, no murmur/rub/gallop; No lower extremity edema  GASTROINTESTINAL: Nontender to palpation, normoactive bowel sounds, no rebound/guarding; No hepatosplenomegaly  MUSCULOSKELETAL:  no clubbing or cyanosis of digits; no joint swelling or tenderness to palpation  NEUROLOGY: non-focal; no gross sensory deficits   PSYCH: A+O to person, place, and time; affect appropriate  SKIN: No rashes; warm     LABS:                        13.0   4.18  )-----------( 242      ( 14 Mar 2021 12:23 )             42.9     03-14    134<L>  |  95<L>  |  48<H>  ----------------------------<  144<H>  4.9   |  23  |  1.51<H>    Ca    10.1      14 Mar 2021 12:23  Phos  2.9     03-13  Mg     1.9     03-13    TPro  7.4  /  Alb  3.9  /  TBili  0.2  /  DBili  x   /  AST  31  /  ALT  17  /  AlkPhos  76  03-14                RADIOLOGY & ADDITIONAL TESTS:  Results Reviewed:   Imaging Personally Reviewed:  Electrocardiogram Personally Reviewed:    COORDINATION OF CARE:  Care Discussed with Consultants/Other Providers [Y/N]:  Prior or Outpatient Records Reviewed [Y/N]:

## 2021-03-15 NOTE — PROGRESS NOTE ADULT - PROBLEM SELECTOR PLAN 3
SCr. peaked at 1.5 (baseline 0.6-0.8). Improved today  - likely pre-renal, hold lasix/valsartan, gentle IVF hydration   - creatinine clearance >30 - d/w ID fellow Dr. Lisandro plasencia c/w remdesjanesir for now  - trend BMP, renally dose medications

## 2021-03-15 NOTE — PROGRESS NOTE ADULT - PROBLEM SELECTOR PLAN 7
- c/w citalopram, quetiapine, alprazolam PRN BP stable   - c/w metoprolol, holding home valsartan for SKY  - monitor BP

## 2021-03-15 NOTE — PROGRESS NOTE ADULT - ASSESSMENT
76F, Swedish-speaking, with hx of aortic stenosis s/p porcine valve replacement (10 years ago), HTN, HLD, CKD who presents with SOB and cough a/w acute hypoxic respiratory failure 2/2 COVID PNA. Course c/b SKY.

## 2021-03-15 NOTE — PROGRESS NOTE ADULT - PROBLEM SELECTOR PLAN 6
BP stable   - c/w metoprolol, holding home valsartan for SKY  - monitor BP Chronic b/l LE edema  - holding home lasix for SKY

## 2021-03-15 NOTE — PROGRESS NOTE ADULT - PROBLEM SELECTOR PLAN 1
Hypoxic on admission in setting of COVID infection  - monitor oxygen saturations closely  - continue with supplemental O2 to maintain O2 sat's 88% and above, preferably 92 and above

## 2021-03-15 NOTE — PROGRESS NOTE ADULT - PROBLEM SELECTOR PLAN 2
Hypoxic on admission  - continue with remdesivir; currently on day 3 of 5  - continue dexamethasone; currently on day 4 of 10  - monitor renal/hepatic function   - monitor inflammatory markers [id number], oxygen sats  - will consider initiation of tocilizumab if patient with increased O2 requirement with advancement to HFNC  - VTE ppx: lovenox 40mg BID

## 2021-03-15 NOTE — PROGRESS NOTE ADULT - SUBJECTIVE AND OBJECTIVE BOX
Patient is a 76y old  Female who presents with a chief complaint of dyspnea (14 Mar 2021 14:14)        SUBJECTIVE / OVERNIGHT EVENTS:  [employer]  phone services utilized for Taiwanese translation, ID# [id number]        MEDICATIONS  (STANDING):  aspirin enteric coated 81 milliGRAM(s) Oral daily  citalopram 20 milliGRAM(s) Oral at bedtime  dexAMETHasone  Injectable 6 milliGRAM(s) IV Push daily  enoxaparin Injectable 40 milliGRAM(s) SubCutaneous two times a day  metoprolol succinate ER 50 milliGRAM(s) Oral daily  QUEtiapine 100 milliGRAM(s) Oral at bedtime  remdesivir  IVPB   IV Intermittent   remdesivir  IVPB 100 milliGRAM(s) IV Intermittent every 24 hours  sodium chloride 0.9%. 1000 milliLiter(s) (75 mL/Hr) IV Continuous <Continuous>    MEDICATIONS  (PRN):  acetaminophen   Tablet .. 650 milliGRAM(s) Oral every 6 hours PRN Mild Pain (1 - 3)  ALPRAZolam 0.25 milliGRAM(s) Oral three times a day PRN anxiety  oxyCODONE    IR 2.5 milliGRAM(s) Oral every 6 hours PRN Moderate Pain (4 - 6)  oxyCODONE    IR 5 milliGRAM(s) Oral every 6 hours PRN Severe Pain (7 - 10)      Vital Signs Last 24 Hrs  T(C): 36.4 (15 Mar 2021 12:02), Max: 37 (14 Mar 2021 20:52)  T(F): 97.5 (15 Mar 2021 12:02), Max: 98.6 (14 Mar 2021 20:52)  HR: 67 (15 Mar 2021 12:02) (64 - 67)  BP: 122/75 (15 Mar 2021 12:02) (122/75 - 142/62)  RR: 20 (15 Mar 2021 12:02) (18 - 21)  SpO2: 94% (15 Mar 2021 12:02) (90% - 96%)          PHYSICAL EXAM  GENERAL: NAD, well-developed  HEAD:  Atraumatic, Normocephalic  EYES: EOMI, PERRLA, conjunctiva and sclera clear  NECK: Supple, No JVD  CHEST/LUNG: Clear to auscultation bilaterally; No wheeze  HEART: Regular rate and rhythm; No murmurs, rubs, or gallops  ABDOMEN: Soft, Nontender, Nondistended; Bowel sounds present  EXTREMITIES:  2+ Peripheral Pulses, No clubbing, cyanosis, or edema  PSYCH: AAOx3  SKIN: No rashes or lesions    LABS:                        12.3   3.53  )-----------( 223      ( 15 Mar 2021 07:07 )             40.7     03-15    134<L>  |  99  |  55<H>  ----------------------------<  122<H>  4.7   |  22  |  1.24    Ca    9.6      15 Mar 2021 07:07    TPro  6.4  /  Alb  3.4  /  TBili  0.2  /  DBili  x   /  AST  23  /  ALT  13  /  AlkPhos  64  03-15              RADIOLOGY & ADDITIONAL TESTS:    Imaging Personally Reviewed:  Consultant(s) Notes Reviewed:    Care Discussed with Consultants/Other Providers:   Patient is a 76y old  Female who presents with a chief complaint of dyspnea (14 Mar 2021 14:14)        SUBJECTIVE / OVERNIGHT EVENTS:  [employer]  phone services utilized for U[place] translation, ID# [id number]  Patient feels better today compared to yesterday. She feels uncomfortable on NRB mask and HFNC because the "air is too much." Pt with constipation - no BM for ~6 days. No chest pain, SOB, n/v or abdominal pain.  Pt on NRB mask at 15L with O2 sat ranging from 89-94%.       MEDICATIONS  (STANDING):  aspirin enteric coated 81 milliGRAM(s) Oral daily  citalopram 20 milliGRAM(s) Oral at bedtime  dexAMETHasone  Injectable 6 milliGRAM(s) IV Push daily  enoxaparin Injectable 40 milliGRAM(s) SubCutaneous two times a day  metoprolol succinate ER 50 milliGRAM(s) Oral daily  QUEtiapine 100 milliGRAM(s) Oral at bedtime  remdesivir  IVPB   IV Intermittent   remdesivir  IVPB 100 milliGRAM(s) IV Intermittent every 24 hours  sodium chloride 0.9%. 1000 milliLiter(s) (75 mL/Hr) IV Continuous <Continuous>    MEDICATIONS  (PRN):  acetaminophen   Tablet .. 650 milliGRAM(s) Oral every 6 hours PRN Mild Pain (1 - 3)  ALPRAZolam 0.25 milliGRAM(s) Oral three times a day PRN anxiety  oxyCODONE    IR 2.5 milliGRAM(s) Oral every 6 hours PRN Moderate Pain (4 - 6)  oxyCODONE    IR 5 milliGRAM(s) Oral every 6 hours PRN Severe Pain (7 - 10)      Vital Signs Last 24 Hrs  T(C): 36.4 (15 Mar 2021 12:02), Max: 37 (14 Mar 2021 20:52)  T(F): 97.5 (15 Mar 2021 12:02), Max: 98.6 (14 Mar 2021 20:52)  HR: 67 (15 Mar 2021 12:02) (64 - 67)  BP: 122/75 (15 Mar 2021 12:02) (122/75 - 142/62)  RR: 20 (15 Mar 2021 12:02) (18 - 21)  SpO2: 94% (15 Mar 2021 12:02) (90% - 96%)          PHYSICAL EXAM  GENERAL: NAD, on NRB mask at 15L, speaking in full sentences  CHEST/LUNG: Normal respiratory effort; no use of accessory breathing muscles  HEART: Regular rate and rhythm  ABDOMEN: Soft, Nontender, Nondistended; Bowel sounds present  EXTREMITIES:  2+ Peripheral Pulses, No clubbing, cyanosis, or edema. Tenderness to b/l LE to touch with non-pitting edema  PSYCH: AAOx3      LABS:                        12.3   3.53  )-----------( 223      ( 15 Mar 2021 07:07 )             40.7     03-15    134<L>  |  99  |  55<H>  ----------------------------<  122<H>  4.7   |  22  |  1.24    Ca    9.6      15 Mar 2021 07:07    TPro  6.4  /  Alb  3.4  /  TBili  0.2  /  DBili  x   /  AST  23  /  ALT  13  /  AlkPhos  64  03-15

## 2021-03-15 NOTE — PROGRESS NOTE ADULT - PROBLEM SELECTOR PLAN 5
Chronic b/l LE edema  - holding home lasix for SKY Hx of aortic stenosis with porcine valve repair (10 years ago)  - euvolemic on exam  - continue to monitor

## 2021-03-15 NOTE — PROGRESS NOTE ADULT - PROBLEM SELECTOR PLAN 4
Hx of aortic stenosis with porcine valve repair (10 years ago)  - euvolemic on exam  - continue to monitor - start daily bowel regimen --> bisacodyl suppository x1 dose; senna and Miralax daily. Will escalate as needed  - stool counts

## 2021-03-15 NOTE — CHART NOTE - NSCHARTNOTEFT_GEN_A_CORE
Expedited for prelim read of shoulder xray. As per radiology on call- not s/p fall here, non urgent-C/O shoulder pain, will read in am.

## 2021-03-16 NOTE — PROGRESS NOTE ADULT - ASSESSMENT
76F, Danish-speaking, with hx of aortic stenosis s/p porcine valve replacement (10 years ago), HTN, HLD, CKD who presents with SOB and cough a/w acute hypoxic respiratory failure 2/2 COVID PNA. Course c/b SKY.    76F, Yakut-speaking, with hx of aortic stenosis s/p porcine valve replacement (10 years ago), HTN, HLD, CKD who presents with SOB and cough a/w acute hypoxic respiratory failure 2/2 COVID PNA. Course c/b SKY. SKY has resolved, but pt still with high O2 requirements

## 2021-03-16 NOTE — PROGRESS NOTE ADULT - PROBLEM SELECTOR PLAN 6
Chronic b/l LE edema  - holding home lasix for SKY Chronic b/l LE edema  - holding home lasix. Pt euvolemic on exam.

## 2021-03-16 NOTE — PROGRESS NOTE ADULT - PROBLEM SELECTOR PLAN 4
- start daily bowel regimen --> bisacodyl suppository x1 dose; senna and Miralax daily. Will escalate as needed  - stool counts Senna and Miralax daily - pt reportedly refusing and did not receive suppository yesterday. will encourage pt to take bowel regimen  - give bisacodyl suppository today  - stool counts

## 2021-03-16 NOTE — PROGRESS NOTE ADULT - PROBLEM SELECTOR PLAN 3
SCr. peaked at 1.5 (baseline 0.6-0.8). Improved today  - likely pre-renal, hold lasix/valsartan, gentle IVF hydration   - creatinine clearance >30 - d/w ID fellow Dr. Lisandro plasencia c/w remdesjanesir for now  - trend BMP, renally dose medications SCr. peaked at 1.5 (baseline 0.6-0.8). Improved today  - likely pre-renal, hold lasix/valsartan, gentle IVF hydration   - trend BMP,

## 2021-03-16 NOTE — PROGRESS NOTE ADULT - PROBLEM SELECTOR PLAN 2
Hypoxic on admission  - continue with remdesivir; currently on day 3 of 5  - continue dexamethasone; currently on day 4 of 10  - monitor renal/hepatic function   - monitor inflammatory markers [id number], oxygen sats  - will consider initiation of tocilizumab if patient with increased O2 requirement with advancement to HFNC  - VTE ppx: lovenox 40mg BID Hypoxic on admission  - continue with remdesivir; currently on day 4 of 5  - continue dexamethasone; currently on day 5 of 10  - monitor renal/hepatic function   - monitor inflammatory markers [id number], oxygen sats  - Patient is not a candidate for tocilizumab.   - VTE ppx: lovenox 40mg BID

## 2021-03-16 NOTE — PROGRESS NOTE ADULT - SUBJECTIVE AND OBJECTIVE BOX
Patient is a 76y old  Female who presents with a chief complaint of dyspnea (15 Mar 2021 12:06)        SUBJECTIVE / OVERNIGHT EVENTS:      MEDICATIONS  (STANDING):  aspirin enteric coated 81 milliGRAM(s) Oral daily  bisacodyl Suppository 10 milliGRAM(s) Rectal once  citalopram 20 milliGRAM(s) Oral at bedtime  dexAMETHasone  Injectable 6 milliGRAM(s) IV Push daily  enoxaparin Injectable 40 milliGRAM(s) SubCutaneous two times a day  metoprolol succinate ER 50 milliGRAM(s) Oral daily  polyethylene glycol 3350 17 Gram(s) Oral daily  QUEtiapine 100 milliGRAM(s) Oral at bedtime  remdesivir  IVPB   IV Intermittent   remdesivir  IVPB 100 milliGRAM(s) IV Intermittent every 24 hours  senna 2 Tablet(s) Oral at bedtime  sodium chloride 0.9%. 1000 milliLiter(s) (75 mL/Hr) IV Continuous <Continuous>    MEDICATIONS  (PRN):  acetaminophen   Tablet .. 650 milliGRAM(s) Oral every 6 hours PRN Mild Pain (1 - 3)  ALPRAZolam 0.25 milliGRAM(s) Oral three times a day PRN anxiety  oxyCODONE    IR 2.5 milliGRAM(s) Oral every 6 hours PRN Moderate Pain (4 - 6)  oxyCODONE    IR 5 milliGRAM(s) Oral every 6 hours PRN Severe Pain (7 - 10)      Vital Signs Last 24 Hrs  T(C): 36.1 (16 Mar 2021 12:03), Max: 36.7 (15 Mar 2021 20:32)  T(F): 97 (16 Mar 2021 12:03), Max: 98.1 (15 Mar 2021 20:32)  HR: 67 (16 Mar 2021 12:03) (66 - 69)  BP: 127/64 (16 Mar 2021 12:03) (127/64 - 146/79)  BP(mean): --  RR: 19 (16 Mar 2021 12:03) (18 - 22)  SpO2: 95% (16 Mar 2021 12:03) (90% - 95%)  CAPILLARY BLOOD GLUCOSE        I&O's Summary        PHYSICAL EXAM  GENERAL: NAD, well-developed  HEAD:  Atraumatic, Normocephalic  EYES: EOMI, PERRLA, conjunctiva and sclera clear  NECK: Supple, No JVD  CHEST/LUNG: Clear to auscultation bilaterally; No wheeze  HEART: Regular rate and rhythm; No murmurs, rubs, or gallops  ABDOMEN: Soft, Nontender, Nondistended; Bowel sounds present  EXTREMITIES:  2+ Peripheral Pulses, No clubbing, cyanosis, or edema  PSYCH: AAOx3  SKIN: No rashes or lesions    LABS:                        12.8   6.43  )-----------( 249      ( 16 Mar 2021 08:26 )             42.0     03-16    135  |  101  |  47<H>  ----------------------------<  99  4.6   |  23  |  0.91    Ca    9.8      16 Mar 2021 08:26    TPro  6.7  /  Alb  3.5  /  TBili  <0.2  /  DBili  x   /  AST  28  /  ALT  11  /  AlkPhos  64  03-16              RADIOLOGY & ADDITIONAL TESTS:    Imaging Personally Reviewed:  Consultant(s) Notes Reviewed:    Care Discussed with Consultants/Other Providers:   Patient is a 76y old  Female who presents with a chief complaint of dyspnea (15 Mar 2021 12:06)    SUBJECTIVE / OVERNIGHT EVENTS:  [employer]  services utilized for French translation, ID# [id number]  Patient still requiring NRB mask at 15L/min to maintain O2 sat 91-92% but stated she feels better. Spoke to patient's granddaughter, Jo, via FaceTime at bedside, who stated in the past patient has been noted to have O2 sat's in the low 90s even in the medical office. Patient has baseline poor exercise tolerance with dyspnea on exertion but her current status is worse than her baseline. Pt was able to ambulate with assistance to the bathroom on NRB mask at 15L/min but desaturated to 80s, which spontaneously improved once pt was laying in the bed. Pt stated she still is constipated. No chest pain, n/v or abdominal pain.         MEDICATIONS  (STANDING):  aspirin enteric coated 81 milliGRAM(s) Oral daily  bisacodyl Suppository 10 milliGRAM(s) Rectal once  citalopram 20 milliGRAM(s) Oral at bedtime  dexAMETHasone  Injectable 6 milliGRAM(s) IV Push daily  enoxaparin Injectable 40 milliGRAM(s) SubCutaneous two times a day  metoprolol succinate ER 50 milliGRAM(s) Oral daily  polyethylene glycol 3350 17 Gram(s) Oral daily  QUEtiapine 100 milliGRAM(s) Oral at bedtime  remdesivir  IVPB   IV Intermittent   remdesivir  IVPB 100 milliGRAM(s) IV Intermittent every 24 hours  senna 2 Tablet(s) Oral at bedtime  sodium chloride 0.9%. 1000 milliLiter(s) (75 mL/Hr) IV Continuous <Continuous>    MEDICATIONS  (PRN):  acetaminophen   Tablet .. 650 milliGRAM(s) Oral every 6 hours PRN Mild Pain (1 - 3)  ALPRAZolam 0.25 milliGRAM(s) Oral three times a day PRN anxiety  oxyCODONE    IR 2.5 milliGRAM(s) Oral every 6 hours PRN Moderate Pain (4 - 6)  oxyCODONE    IR 5 milliGRAM(s) Oral every 6 hours PRN Severe Pain (7 - 10)      Vital Signs Last 24 Hrs  T(C): 36.1 (16 Mar 2021 12:03), Max: 36.7 (15 Mar 2021 20:32)  T(F): 97 (16 Mar 2021 12:03), Max: 98.1 (15 Mar 2021 20:32)  HR: 67 (16 Mar 2021 12:03) (66 - 69)  BP: 127/64 (16 Mar 2021 12:03) (127/64 - 146/79)  RR: 19 (16 Mar 2021 12:03) (18 - 22)  SpO2: 95% (16 Mar 2021 12:03) (90% - 95%)          PHYSICAL EXAM  GENERAL: NAD, on NRB mask at 15L, speaking in full sentences  CHEST/LUNG: Normal respiratory effort; no use of accessory breathing muscles; dyspnea with minimal activity  HEART: Regular rate and rhythm  ABDOMEN: Soft, Nontender, Nondistended; Bowel sounds present  EXTREMITIES:  2+ Peripheral Pulses, No clubbing, cyanosis, or edema. Tenderness to b/l LE to touch with non-pitting edema  PSYCH: AAOx3      LABS:                        12.8   6.43  )-----------( 249      ( 16 Mar 2021 08:26 )             42.0     03-16    135  |  101  |  47<H>  ----------------------------<  99  4.6   |  23  |  0.91    Ca    9.8      16 Mar 2021 08:26    TPro  6.7  /  Alb  3.5  /  TBili  <0.2  /  DBili  x   /  AST  28  /  ALT  11  /  AlkPhos  64  03-16

## 2021-03-17 NOTE — PROGRESS NOTE ADULT - PROBLEM SELECTOR PLAN 3
SCr. peaked at 1.5 (baseline 0.6-0.8). Now resolved.  - likely pre-renal  - continue to hold lasix. Will consider restarting home regimen of Valsartan pending BP trend for today  - ok for lab holiday for tomorrow

## 2021-03-17 NOTE — PROGRESS NOTE ADULT - PROBLEM SELECTOR PLAN 2
Hypoxic on admission  - continue with remdesivir; currently on day 5 of 5  - continue dexamethasone; currently on day 6 of 10  - monitor renal/hepatic function   - monitor inflammatory markers [id number], oxygen sats  - Patient is not a candidate for tocilizumab.   - VTE ppx: lovenox 40mg BID

## 2021-03-17 NOTE — PROGRESS NOTE ADULT - PROBLEM SELECTOR PLAN 1
Hypoxic on admission in setting of COVID infection  - monitor oxygen saturations closely  - continue with supplemental O2 to maintain O2 sat's 88% and above, preferably 92 and above. Pt currently on NRB mask at 10L/min

## 2021-03-17 NOTE — PROGRESS NOTE ADULT - SUBJECTIVE AND OBJECTIVE BOX
Patient is a 76y old  Female who presents with a chief complaint of Acute hypoxic respiratory failure 2/2 COVID-19 (16 Mar 2021 12:07)    SUBJECTIVE / OVERNIGHT EVENTS:  Pt complaining of severe abdominal pain which is now causing abdominal discomfort. Pt with small BM this AM after significant straining. Pt tolerating oral diet without n/v. Patient remains on NRB mask but able to decrease from 15L/min to 10L/min and still maintain O2 sat 92-93% while at bedside.  Pt without chest pain, fevers or chills.     MEDICATIONS  (STANDING):  aspirin enteric coated 81 milliGRAM(s) Oral daily  citalopram 20 milliGRAM(s) Oral at bedtime  dexAMETHasone  Injectable 6 milliGRAM(s) IV Push daily  enoxaparin Injectable 40 milliGRAM(s) SubCutaneous two times a day  metoprolol succinate ER 50 milliGRAM(s) Oral daily  polyethylene glycol 3350 17 Gram(s) Oral daily  QUEtiapine 100 milliGRAM(s) Oral at bedtime  senna 2 Tablet(s) Oral at bedtime  sodium chloride 0.9%. 1000 milliLiter(s) (75 mL/Hr) IV Continuous <Continuous>    MEDICATIONS  (PRN):  acetaminophen   Tablet .. 650 milliGRAM(s) Oral every 6 hours PRN Mild Pain (1 - 3)  ALPRAZolam 0.25 milliGRAM(s) Oral three times a day PRN anxiety  oxyCODONE    IR 2.5 milliGRAM(s) Oral every 6 hours PRN Moderate Pain (4 - 6)  oxyCODONE    IR 5 milliGRAM(s) Oral every 6 hours PRN Severe Pain (7 - 10)      Vital Signs Last 24 Hrs  T(C): 36.8 (17 Mar 2021 06:11), Max: 36.8 (16 Mar 2021 23:33)  T(F): 98.2 (17 Mar 2021 06:11), Max: 98.2 (16 Mar 2021 23:33)  HR: 73 (17 Mar 2021 06:11) (64 - 73)  BP: 159/67 (17 Mar 2021 06:11) (159/67 - 160/90)  RR: 18 (17 Mar 2021 06:11) (18 - 18)  SpO2: 94% (17 Mar 2021 06:11) (94% - 96%)          PHYSICAL EXAM  GENERAL: NAD, on NRB mask at 10L, speaking in full sentences; ambulates independently  CHEST/LUNG: Normal respiratory effort; no use of accessory breathing muscles; dyspnea with minimal activity  HEART: Regular rate and rhythm  ABDOMEN: Soft, Nontender, Nondistended; Bowel sounds present  EXTREMITIES:  ROM intact, No clubbing, cyanosis, or edema. b/l LE non-pitting edema  PSYCH: AAOx3        LABS:                        12.3   6.89  )-----------( 248      ( 17 Mar 2021 07:03 )             40.4     03-17    138  |  104  |  32<H>  ----------------------------<  97  4.1   |  23  |  0.81    Ca    10.1      17 Mar 2021 07:03  Phos  2.4     03-17  Mg     2.0     03-17    TPro  6.7  /  Alb  3.5  /  TBili  <0.2  /  DBili  x   /  AST  28  /  ALT  11  /  AlkPhos  64  03-16             Patient is a 76y old  Female who presents with a chief complaint of Acute hypoxic respiratory failure 2/2 COVID-19 (16 Mar 2021 12:07)    SUBJECTIVE / OVERNIGHT EVENTS:  [employer]  services utilized for Turkish translation, ID# [id number]  Pt complaining of severe abdominal pain which is now causing abdominal discomfort. Pt with small BM this AM after significant straining. Pt tolerating oral diet without n/v. Patient remains on NRB mask but able to decrease from 15L/min to 10L/min and still maintain O2 sat 92-93% while at bedside.  Pt without chest pain, fevers or chills.     MEDICATIONS  (STANDING):  aspirin enteric coated 81 milliGRAM(s) Oral daily  citalopram 20 milliGRAM(s) Oral at bedtime  dexAMETHasone  Injectable 6 milliGRAM(s) IV Push daily  enoxaparin Injectable 40 milliGRAM(s) SubCutaneous two times a day  metoprolol succinate ER 50 milliGRAM(s) Oral daily  polyethylene glycol 3350 17 Gram(s) Oral daily  QUEtiapine 100 milliGRAM(s) Oral at bedtime  senna 2 Tablet(s) Oral at bedtime  sodium chloride 0.9%. 1000 milliLiter(s) (75 mL/Hr) IV Continuous <Continuous>    MEDICATIONS  (PRN):  acetaminophen   Tablet .. 650 milliGRAM(s) Oral every 6 hours PRN Mild Pain (1 - 3)  ALPRAZolam 0.25 milliGRAM(s) Oral three times a day PRN anxiety  oxyCODONE    IR 2.5 milliGRAM(s) Oral every 6 hours PRN Moderate Pain (4 - 6)  oxyCODONE    IR 5 milliGRAM(s) Oral every 6 hours PRN Severe Pain (7 - 10)      Vital Signs Last 24 Hrs  T(C): 36.8 (17 Mar 2021 06:11), Max: 36.8 (16 Mar 2021 23:33)  T(F): 98.2 (17 Mar 2021 06:11), Max: 98.2 (16 Mar 2021 23:33)  HR: 73 (17 Mar 2021 06:11) (64 - 73)  BP: 159/67 (17 Mar 2021 06:11) (159/67 - 160/90)  RR: 18 (17 Mar 2021 06:11) (18 - 18)  SpO2: 94% (17 Mar 2021 06:11) (94% - 96%)          PHYSICAL EXAM  GENERAL: NAD, on NRB mask at 10L, speaking in full sentences; ambulates independently  CHEST/LUNG: Normal respiratory effort; no use of accessory breathing muscles; dyspnea with minimal activity  HEART: Regular rate and rhythm  ABDOMEN: Soft, Nontender, Nondistended; Bowel sounds present  EXTREMITIES:  ROM intact, No clubbing, cyanosis, or edema. b/l LE non-pitting edema  PSYCH: AAOx3        LABS:                        12.3   6.89  )-----------( 248      ( 17 Mar 2021 07:03 )             40.4     03-17    138  |  104  |  32<H>  ----------------------------<  97  4.1   |  23  |  0.81    Ca    10.1      17 Mar 2021 07:03  Phos  2.4     03-17  Mg     2.0     03-17    TPro  6.7  /  Alb  3.5  /  TBili  <0.2  /  DBili  x   /  AST  28  /  ALT  11  /  AlkPhos  64  03-16

## 2021-03-17 NOTE — PROGRESS NOTE ADULT - PROBLEM SELECTOR PLAN 7
Mildly elevated BP  - c/w metoprolol,   - will consider resuming home valsartan tomorrow pending BP trend  - monitor BP

## 2021-03-17 NOTE — PROGRESS NOTE ADULT - ASSESSMENT
76F, Chinese-speaking, with hx of aortic stenosis s/p porcine valve replacement (10 years ago), HTN, HLD, CKD who presents with SOB and cough a/w acute hypoxic respiratory failure 2/2 COVID PNA. Course c/b SKY. SKY has resolved, but pt still with high O2 requirements

## 2021-03-17 NOTE — PROGRESS NOTE ADULT - PROBLEM SELECTOR PLAN 4
Pt still symptomatic. Not relieved s/p rectal suppository  - give SMOG enema today  - daily bowel regimen with senna/Miralax  - stool counts  - would avoid narcotics --> pt received oxycodone yesterday

## 2021-03-18 NOTE — PROGRESS NOTE ADULT - PROBLEM SELECTOR PLAN 2
Hypoxic on admission. Completed 5-day course of Remdesivir on 3/17/21  - continue dexamethasone; currently on day 7 of 10  - monitor inflammatory markers [id number], oxygen sats    - VTE ppx: lovenox 40mg BID

## 2021-03-18 NOTE — PROGRESS NOTE ADULT - PROBLEM SELECTOR PLAN 1
Hypoxic on admission in setting of COVID infection  - monitor oxygen saturations closely  - continue with supplemental O2 to maintain O2 sat's 88% and above, preferably 90 and above. Pt transitioned to 6L O2 via NC

## 2021-03-18 NOTE — PROGRESS NOTE ADULT - ASSESSMENT
76F, Divehi-speaking, with hx of aortic stenosis s/p porcine valve replacement (10 years ago), HTN, HLD, CKD who presents with SOB and cough a/w acute hypoxic respiratory failure 2/2 COVID PNA. Course c/b SKY. SKY has resolved, but pt still with high O2 requirements

## 2021-03-18 NOTE — PROGRESS NOTE ADULT - PROBLEM SELECTOR PLAN 8
Pt with associated anxiety  - c/w citalopram, quetiapine  - change to standing alprazolam 0.25mg TID

## 2021-03-18 NOTE — PROGRESS NOTE ADULT - PROBLEM SELECTOR PLAN 7
Mildly elevated BP  - c/w metoprolol,   - Resume Valsartan 320mg daily. Hold Lasix and HCTZ  - monitor BP

## 2021-03-18 NOTE — PROGRESS NOTE ADULT - PROBLEM SELECTOR PLAN 3
SCr. peaked at 1.5 (baseline 0.6-0.8). Resolved. Likely pre-renal  - continue to hold lasix 40mg daily and HCTZ 12.5mg daily

## 2021-03-18 NOTE — PROGRESS NOTE ADULT - SUBJECTIVE AND OBJECTIVE BOX
Patient is a 76y old  Female who presents with a chief complaint of Acute hypoxic respiratory failure 2/2 COVID (17 Mar 2021 12:08)    SUBJECTIVE / OVERNIGHT EVENTS:  [employer]  services utilized for Portuguese translation, ID#  No acute events overnight. Patient with BM after SMOG enema. Pt denied n/v or abdominal pain. Pt not eating much as per her nurse today. Pt transitioned from NRB mask to 6L O2 via     Spoke to patient family, pt has longstanding anxiety and takes Xanax TID every day at home. She also has poor exercise tolerance at baseline, even before getting COVID-19. At baseline, pt primarily sits on the couch when at home. She gets out of breath when going to the bathroom or walking one block. The patient prefers to sit in a chair rather than stay in bed all day.    MEDICATIONS  (STANDING):  aspirin enteric coated 81 milliGRAM(s) Oral daily  citalopram 20 milliGRAM(s) Oral at bedtime  dexAMETHasone  Injectable 6 milliGRAM(s) IV Push daily  enoxaparin Injectable 40 milliGRAM(s) SubCutaneous two times a day  metoprolol succinate ER 50 milliGRAM(s) Oral daily  polyethylene glycol 3350 17 Gram(s) Oral two times a day  QUEtiapine 100 milliGRAM(s) Oral at bedtime  senna 2 Tablet(s) Oral at bedtime  sodium chloride 0.9%. 1000 milliLiter(s) (75 mL/Hr) IV Continuous <Continuous>    MEDICATIONS  (PRN):  acetaminophen   Tablet .. 650 milliGRAM(s) Oral every 6 hours PRN Mild Pain (1 - 3)  ALPRAZolam 0.25 milliGRAM(s) Oral three times a day PRN anxiety  oxyCODONE    IR 5 milliGRAM(s) Oral every 6 hours PRN Severe Pain (7 - 10)  oxyCODONE    IR 2.5 milliGRAM(s) Oral every 6 hours PRN Moderate Pain (4 - 6)      Vital Signs Last 24 Hrs  T(C): 36.6 (18 Mar 2021 05:16), Max: 36.9 (17 Mar 2021 20:28)  T(F): 97.9 (18 Mar 2021 05:16), Max: 98.5 (17 Mar 2021 20:28)  HR: 71 (18 Mar 2021 05:16) (69 - 71)  BP: 141/68 (18 Mar 2021 05:16) (141/68 - 145/67)  RR: 20 (18 Mar 2021 16:00) (18 - 20)  SpO2: 91% (18 Mar 2021 16:00) (91% - 95%)          PHYSICAL EXAM  GENERAL: NAD, on 6L O2 via NC, speaking in full sentences; ambulates independently  CHEST/LUNG: Normal respiratory effort; no use of accessory breathing muscles; dyspnea with minimal activity  HEART: Regular rate and rhythm  ABDOMEN: Soft, Nontender, Nondistended; Bowel sounds present  EXTREMITIES:  ROM intact, No clubbing, cyanosis, or edema. b/l LE non-pitting edema  PSYCH: AAOx3

## 2021-03-18 NOTE — PROGRESS NOTE ADULT - PROBLEM SELECTOR PLAN 4
Relieved s/p SMOG enema on 3/17  - daily bowel regimen with senna/Miralax  - stool counts  - would avoid narcotics as much as tolerated

## 2021-03-19 NOTE — PHYSICAL THERAPY INITIAL EVALUATION ADULT - PRECAUTIONS/LIMITATIONS, REHAB EVAL
isolation precautions/oxygen therapy device and L/min NRB mask at 15LO2/isolation precautions/oxygen therapy device and L/min

## 2021-03-19 NOTE — PHYSICAL THERAPY INITIAL EVALUATION ADULT - PERTINENT HX OF CURRENT PROBLEM, REHAB EVAL
This is a 76y F presented due to progressive SOB, non productive cough ,myalgia and nausea over 1 week duration. she tested positive for COVID-19 This is a 76y F , Kyrgyz speaking , presented due to progressive SOB, non productive cough ,myalgia and nausea over 1 week duration. she tested positive for COVID-19

## 2021-03-19 NOTE — PHYSICAL THERAPY INITIAL EVALUATION ADULT - GENERAL OBSERVATIONS, REHAB EVAL
Patient received seated at bedside with MD Valery Sue at bedside , on 6LO2 via nasal cannula SpO2 92% desaturating to 80% with activities , switched to NRB mask at 15LO2 SpO2 92% after rest , pt with no MD BELLA present at bedside throughtout the PT evaluation.

## 2021-03-19 NOTE — PROGRESS NOTE ADULT - SUBJECTIVE AND OBJECTIVE BOX
Patient is a 76y old  Female who presents with a chief complaint of Acute hypoxic respiratory failure 2/2 COVID (17 Mar 2021 12:08)      SUBJECTIVE / OVERNIGHT EVENTS:  Patient very anxious and requesting Xanax. No chest pain or SOB at rest. No n/v or abdominal pain. Pt with poor appetite. Pt transitioned from NRB mask to 6L O2 via NC and pt sat on recliner chair.      MEDICATIONS  (STANDING):  ALPRAZolam 0.25 milliGRAM(s) Oral three times a day  aspirin enteric coated 81 milliGRAM(s) Oral daily  citalopram 20 milliGRAM(s) Oral at bedtime  dexAMETHasone  Injectable 6 milliGRAM(s) IV Push daily  enoxaparin Injectable 40 milliGRAM(s) SubCutaneous two times a day  metoprolol succinate ER 50 milliGRAM(s) Oral daily  polyethylene glycol 3350 17 Gram(s) Oral two times a day  QUEtiapine 100 milliGRAM(s) Oral at bedtime  senna 2 Tablet(s) Oral at bedtime  sodium chloride 0.9%. 1000 milliLiter(s) (75 mL/Hr) IV Continuous <Continuous>  valsartan 320 milliGRAM(s) Oral daily    MEDICATIONS  (PRN):  acetaminophen   Tablet .. 650 milliGRAM(s) Oral every 6 hours PRN Mild Pain (1 - 3)  oxyCODONE    IR 5 milliGRAM(s) Oral every 6 hours PRN Severe Pain (7 - 10)  oxyCODONE    IR 2.5 milliGRAM(s) Oral every 6 hours PRN Moderate Pain (4 - 6)      Vital Signs Last 24 Hrs  T(C): 36.6 (19 Mar 2021 21:30), Max: 36.7 (19 Mar 2021 06:59)  T(F): 97.9 (19 Mar 2021 21:30), Max: 98.1 (19 Mar 2021 06:59)  HR: 90 (19 Mar 2021 21:30) (64 - 90)  BP: 142/84 (19 Mar 2021 21:30) (115/78 - 149/87)  RR: 24 (19 Mar 2021 21:31) (16 - 24)  SpO2: 96% (19 Mar 2021 21:31) (84% - 97%)          PHYSICAL EXAM  GENERAL: NAD, on 6L O2 via NC, speaking in full sentences; ambulates independently  CHEST/LUNG: Normal respiratory effort; no use of accessory breathing muscles; dyspnea with minimal activity  HEART: Regular rate and rhythm  ABDOMEN: Soft, Nontender, Nondistended; Bowel sounds present  EXTREMITIES:  ROM intact, No clubbing, cyanosis, or edema. b/l LE non-pitting edema  PSYCH: AAOx3        LABS:                        13.0   7.20  )-----------( 245      ( 19 Mar 2021 06:53 )             43.2     03-19    137  |  101  |  29<H>  ----------------------------<  109<H>  4.2   |  24  |  0.89    Ca    9.9      19 Mar 2021 06:53  Phos  2.9     03-19  Mg     2.2     03-19

## 2021-03-19 NOTE — PROGRESS NOTE ADULT - ASSESSMENT
76F, Nepali-speaking, with hx of aortic stenosis s/p porcine valve replacement (10 years ago), HTN, HLD, CKD who presents with SOB and cough a/w acute hypoxic respiratory failure 2/2 COVID PNA. Course c/b SKY. SKY has resolved, but pt still with high O2 requirements

## 2021-03-19 NOTE — PROGRESS NOTE ADULT - PROBLEM SELECTOR PLAN 2
Hypoxic on admission. Completed 5-day course of Remdesivir on 3/17/21  - continue dexamethasone; currently on day 8 of 10  - monitor inflammatory markers [id number], oxygen sats  - VTE ppx: lovenox 40mg BID

## 2021-03-20 NOTE — PROGRESS NOTE ADULT - PROBLEM SELECTOR PLAN 3
SCr. peaked at 1.5 (baseline 0.6-0.8). Now with recurrent episode with increase in BUN/Cr, likely due to inadequate oral intake and re-initiation of home regimen of ARB  - start normal saline @ 100cc/hr for 1 day; encourage oral intake as tolerated  - continue to hold lasix 40mg daily and HCTZ 12.5mg daily

## 2021-03-20 NOTE — PROGRESS NOTE ADULT - PROBLEM SELECTOR PLAN 7
BP better controlled this AM  - c/w metoprolol,   - c/w Valsartan 320mg daily. If Cr continues to increase, will hold  - Hold Lasix and HCTZ  - monitor BP

## 2021-03-20 NOTE — PROGRESS NOTE ADULT - ASSESSMENT
76F, Greek-speaking, with hx of aortic stenosis s/p porcine valve replacement (10 years ago), HTN, HLD, CKD who presents with SOB and cough a/w acute hypoxic respiratory failure 2/2 COVID PNA. Course c/b SKY. SKY has resolved, but pt still with high O2 requirements

## 2021-03-20 NOTE — PROGRESS NOTE ADULT - SUBJECTIVE AND OBJECTIVE BOX
Patient is a 76y old  Female who presents with a chief complaint of Acute hypoxic respiratory failure 2/2 COVID (19 Mar 2021 12:57)      SUBJECTIVE / OVERNIGHT EVENTS:  [place]  services utilized for Arabic translation, ID#  Patient transitioned to NRB mask overnight due to desaturation to 85%.     MEDICATIONS  (STANDING):  ALPRAZolam 0.25 milliGRAM(s) Oral three times a day  aspirin enteric coated 81 milliGRAM(s) Oral daily  citalopram 20 milliGRAM(s) Oral at bedtime  dexAMETHasone  Injectable 6 milliGRAM(s) IV Push daily  enoxaparin Injectable 40 milliGRAM(s) SubCutaneous two times a day  metoprolol succinate ER 50 milliGRAM(s) Oral daily  polyethylene glycol 3350 17 Gram(s) Oral two times a day  QUEtiapine 100 milliGRAM(s) Oral at bedtime  senna 2 Tablet(s) Oral at bedtime  sodium chloride 0.9%. 1000 milliLiter(s) (75 mL/Hr) IV Continuous <Continuous>  valsartan 320 milliGRAM(s) Oral daily    MEDICATIONS  (PRN):  acetaminophen   Tablet .. 650 milliGRAM(s) Oral every 6 hours PRN Mild Pain (1 - 3)  oxyCODONE    IR 5 milliGRAM(s) Oral every 6 hours PRN Severe Pain (7 - 10)  oxyCODONE    IR 2.5 milliGRAM(s) Oral every 6 hours PRN Moderate Pain (4 - 6)      Vital Signs Last 24 Hrs  T(C): 36.3 (20 Mar 2021 06:05), Max: 36.6 (19 Mar 2021 18:00)  T(F): 97.3 (20 Mar 2021 06:05), Max: 97.9 (19 Mar 2021 18:00)  HR: 74 (20 Mar 2021 06:05) (71 - 90)  BP: 122/70 (20 Mar 2021 06:05) (122/70 - 149/87)  BP(mean): --  RR: 20 (20 Mar 2021 06:05) (20 - 24)  SpO2: 95% (20 Mar 2021 06:05) (85% - 96%)  CAPILLARY BLOOD GLUCOSE        I&O's Summary    19 Mar 2021 07:01  -  20 Mar 2021 07:00  --------------------------------------------------------  IN: 360 mL / OUT: 700 mL / NET: -340 mL          PHYSICAL EXAM  GENERAL: NAD  CHEST/LUNG: Normal respiratory effort; no use of accessory breathing muscles; dyspnea with minimal activity  HEART: Regular rate and rhythm  ABDOMEN: Soft, Nontender, Nondistended; Bowel sounds present  EXTREMITIES:  ROM intact, No clubbing, cyanosis, or edema. b/l LE non-pitting edema  PSYCH: AAOx3, anxious         LABS:                        13.0   8.32  )-----------( 261      ( 20 Mar 2021 05:03 )             43.5     03-20    138  |  101  |  46<H>  ----------------------------<  96  4.6   |  24  |  1.29    Ca    10.4      20 Mar 2021 05:03  Phos  3.4     03-20  Mg     2.4     03-20       Patient is a 76y old  Female who presents with a chief complaint of Acute hypoxic respiratory failure 2/2 COVID (19 Mar 2021 12:57)      SUBJECTIVE / OVERNIGHT EVENTS:  [place]  services utilized for Amharic translation, ID#  [id number]  Patient transitioned to NRB mask overnight due to desaturation to 85%. Patient reported feeling better. She prefers sitting in the chair. She has been enjoying eating Jell-o and fruit cup. Patient with O2 sat 85-86% on 6L O2 via NC. Pt with appropriate O2 sat on NRB mask at 10L.      MEDICATIONS  (STANDING):  ALPRAZolam 0.25 milliGRAM(s) Oral three times a day  aspirin enteric coated 81 milliGRAM(s) Oral daily  citalopram 20 milliGRAM(s) Oral at bedtime  dexAMETHasone  Injectable 6 milliGRAM(s) IV Push daily  enoxaparin Injectable 40 milliGRAM(s) SubCutaneous two times a day  metoprolol succinate ER 50 milliGRAM(s) Oral daily  polyethylene glycol 3350 17 Gram(s) Oral two times a day  QUEtiapine 100 milliGRAM(s) Oral at bedtime  senna 2 Tablet(s) Oral at bedtime  sodium chloride 0.9%. 1000 milliLiter(s) (75 mL/Hr) IV Continuous <Continuous>  valsartan 320 milliGRAM(s) Oral daily    MEDICATIONS  (PRN):  acetaminophen   Tablet .. 650 milliGRAM(s) Oral every 6 hours PRN Mild Pain (1 - 3)  oxyCODONE    IR 5 milliGRAM(s) Oral every 6 hours PRN Severe Pain (7 - 10)  oxyCODONE    IR 2.5 milliGRAM(s) Oral every 6 hours PRN Moderate Pain (4 - 6)      Vital Signs Last 24 Hrs  T(C): 36.3 (20 Mar 2021 06:05), Max: 36.6 (19 Mar 2021 18:00)  T(F): 97.3 (20 Mar 2021 06:05), Max: 97.9 (19 Mar 2021 18:00)  HR: 74 (20 Mar 2021 06:05) (71 - 90)  BP: 122/70 (20 Mar 2021 06:05) (122/70 - 149/87)  RR: 20 (20 Mar 2021 06:05) (20 - 24)  SpO2: 95% (20 Mar 2021 06:05) (85% - 96%)          I&O's Summary    19 Mar 2021 07:01  -  20 Mar 2021 07:00  --------------------------------------------------------  IN: 360 mL / OUT: 700 mL / NET: -340 mL          PHYSICAL EXAM  GENERAL: NAD, on NRB mask at 10L  CHEST/LUNG: Normal respiratory effort; no use of accessory breathing muscles; dyspnea with minimal activity  HEART: Regular rate and rhythm  ABDOMEN: Soft, Nontender, Nondistended; Bowel sounds present  EXTREMITIES:  ROM intact, No clubbing, cyanosis, or edema. b/l LE non-pitting edema  PSYCH: AAOx3, anxious         LABS:                        13.0   8.32  )-----------( 261      ( 20 Mar 2021 05:03 )             43.5     03-20    138  |  101  |  46<H>  ----------------------------<  96  4.6   |  24  |  1.29    Ca    10.4      20 Mar 2021 05:03  Phos  3.4     03-20  Mg     2.4     03-20

## 2021-03-20 NOTE — PROGRESS NOTE ADULT - PROBLEM SELECTOR PLAN 2
Hypoxic on admission. Completed 5-day course of Remdesivir on 3/17/21  - continue dexamethasone; currently on day 9 of 10  - monitor inflammatory markers [id number], oxygen sats  - VTE ppx: lovenox 40mg BID

## 2021-03-20 NOTE — PROGRESS NOTE ADULT - PROBLEM SELECTOR PLAN 1
Hypoxic on admission in setting of COVID infection. Suspect underlying lung pathology, such as undiagnosed sleep apnea based on collateral hx from pt and her family.   - monitor oxygen saturations closely  - continue with supplemental O2 to maintain O2 sat's 88% and above, preferably 90 and above. Pt transitioned to 6L O2 via NC  - recommend outpatient sleep study Hypoxic on admission in setting of COVID infection. Suspect underlying lung pathology, such as undiagnosed sleep apnea based on collateral hx from pt and her family. Pt appears comfortable despite hypoxia.   - monitor oxygen saturations closely  - continue with supplemental O2 to maintain O2 sat's 88% and above, preferably 90 and above.   - recommend outpatient sleep study

## 2021-03-21 NOTE — PROGRESS NOTE ADULT - PROBLEM SELECTOR PLAN 7
BP better controlled this AM  - c/w metoprolol,   - c/w Valsartan 320mg daily. If Cr continues to increase, will hold  - Hold Lasix and HCTZ  - monitor BP BP better controlled this AM  - c/w metoprolol,   - c/w Valsartan 320mg daily.   - Hold Lasix and HCTZ  - monitor BP

## 2021-03-21 NOTE — PROGRESS NOTE ADULT - SUBJECTIVE AND OBJECTIVE BOX
Patient is a 76y old  Female who presents with a chief complaint of Acute hypoxic respiratory failure 2/2 COVID (20 Mar 2021 10:54)        SUBJECTIVE / OVERNIGHT EVENTS:      MEDICATIONS  (STANDING):  ALPRAZolam 0.25 milliGRAM(s) Oral three times a day  aspirin enteric coated 81 milliGRAM(s) Oral daily  citalopram 20 milliGRAM(s) Oral at bedtime  enoxaparin Injectable 40 milliGRAM(s) SubCutaneous two times a day  metoprolol succinate ER 50 milliGRAM(s) Oral daily  polyethylene glycol 3350 17 Gram(s) Oral two times a day  QUEtiapine 100 milliGRAM(s) Oral at bedtime  senna 2 Tablet(s) Oral at bedtime  sodium chloride 0.9%. 1000 milliLiter(s) (100 mL/Hr) IV Continuous <Continuous>  valsartan 320 milliGRAM(s) Oral daily    MEDICATIONS  (PRN):  acetaminophen   Tablet .. 650 milliGRAM(s) Oral every 6 hours PRN Mild Pain (1 - 3)  oxyCODONE    IR 5 milliGRAM(s) Oral every 6 hours PRN Severe Pain (7 - 10)  oxyCODONE    IR 2.5 milliGRAM(s) Oral every 6 hours PRN Moderate Pain (4 - 6)      Vital Signs Last 24 Hrs  T(C): 36.4 (21 Mar 2021 05:11), Max: 36.7 (20 Mar 2021 21:02)  T(F): 97.6 (21 Mar 2021 05:11), Max: 98 (20 Mar 2021 21:02)  HR: 71 (21 Mar 2021 05:11) (69 - 71)  BP: 127/60 (21 Mar 2021 05:11) (127/60 - 136/72)  BP(mean): --  RR: 18 (21 Mar 2021 05:11) (18 - 18)  SpO2: 96% (21 Mar 2021 05:11) (94% - 96%)  CAPILLARY BLOOD GLUCOSE        I&O's Summary    20 Mar 2021 07:01  -  21 Mar 2021 07:00  --------------------------------------------------------  IN: 800 mL / OUT: 600 mL / NET: 200 mL          PHYSICAL EXAM  GENERAL: NAD, well-developed  HEAD:  Atraumatic, Normocephalic  EYES: EOMI, PERRLA, conjunctiva and sclera clear  NECK: Supple, No JVD  CHEST/LUNG: Clear to auscultation bilaterally; No wheeze  HEART: Regular rate and rhythm; No murmurs, rubs, or gallops  ABDOMEN: Soft, Nontender, Nondistended; Bowel sounds present  EXTREMITIES:  2+ Peripheral Pulses, No clubbing, cyanosis, or edema  PSYCH: AAOx3  SKIN: No rashes or lesions    LABS:                        13.0   8.32  )-----------( 261      ( 20 Mar 2021 05:03 )             43.5     03-21    136  |  105  |  31<H>  ----------------------------<  81  4.7   |  18<L>  |  0.76    Ca    9.9      21 Mar 2021 04:33  Phos  2.8     03-21  Mg     2.2     03-21                RADIOLOGY & ADDITIONAL TESTS:    Imaging Personally Reviewed:  Consultant(s) Notes Reviewed:    Care Discussed with Consultants/Other Providers:   Patient is a 76y old  Female who presents with a chief complaint of Acute hypoxic respiratory failure 2/2 COVID (20 Mar 2021 10:54)      SUBJECTIVE / OVERNIGHT EVENTS:  No acute events. Pt maintained on NRB mask at 15L/min O2 with O2 sat 95% and above. Tried 6L NC and pt decreased to O2 sat 88-89%. Pt feels better though. NO chest pain or SOB at rest. Pt with baseline anxiety     MEDICATIONS  (STANDING):  ALPRAZolam 0.25 milliGRAM(s) Oral three times a day  aspirin enteric coated 81 milliGRAM(s) Oral daily  citalopram 20 milliGRAM(s) Oral at bedtime  enoxaparin Injectable 40 milliGRAM(s) SubCutaneous two times a day  metoprolol succinate ER 50 milliGRAM(s) Oral daily  polyethylene glycol 3350 17 Gram(s) Oral two times a day  QUEtiapine 100 milliGRAM(s) Oral at bedtime  senna 2 Tablet(s) Oral at bedtime  sodium chloride 0.9%. 1000 milliLiter(s) (100 mL/Hr) IV Continuous <Continuous>  valsartan 320 milliGRAM(s) Oral daily    MEDICATIONS  (PRN):  acetaminophen   Tablet .. 650 milliGRAM(s) Oral every 6 hours PRN Mild Pain (1 - 3)  oxyCODONE    IR 5 milliGRAM(s) Oral every 6 hours PRN Severe Pain (7 - 10)  oxyCODONE    IR 2.5 milliGRAM(s) Oral every 6 hours PRN Moderate Pain (4 - 6)      Vital Signs Last 24 Hrs  T(C): 36.4 (21 Mar 2021 05:11), Max: 36.7 (20 Mar 2021 21:02)  T(F): 97.6 (21 Mar 2021 05:11), Max: 98 (20 Mar 2021 21:02)  HR: 71 (21 Mar 2021 05:11) (69 - 71)  BP: 127/60 (21 Mar 2021 05:11) (127/60 - 136/72)  BP(mean): --  RR: 18 (21 Mar 2021 05:11) (18 - 18)  SpO2: 96% (21 Mar 2021 05:11) (94% - 96%)  CAPILLARY BLOOD GLUCOSE        I&O's Summary    20 Mar 2021 07:01  -  21 Mar 2021 07:00  --------------------------------------------------------  IN: 800 mL / OUT: 600 mL / NET: 200 mL          PHYSICAL EXAM  GENERAL: NAD, morbidly obese, on NRB mask at 15L  CHEST/LUNG: Normal respiratory effort; no use of accessory breathing muscles; dyspnea with minimal activity  HEART: Regular rate and rhythm  ABDOMEN: Soft, Nontender, Nondistended; Bowel sounds present  EXTREMITIES:  ROM intact, No clubbing, cyanosis, or edema. b/l LE non-pitting edema  PSYCH: AAOx3, anxious     LABS:                        13.0   8.32  )-----------( 261      ( 20 Mar 2021 05:03 )             43.5     03-21    136  |  105  |  31<H>  ----------------------------<  81  4.7   |  18<L>  |  0.76    Ca    9.9      21 Mar 2021 04:33  Phos  2.8     03-21  Mg     2.2     03-21

## 2021-03-21 NOTE — PROGRESS NOTE ADULT - PROBLEM SELECTOR PLAN 1
Hypoxic on admission in setting of COVID infection. Suspect underlying lung pathology, such as undiagnosed sleep apnea based on collateral hx from pt and her family. Pt appears comfortable despite hypoxia.   - monitor oxygen saturations closely  - continue with supplemental O2 to maintain O2 sat's 88% and above, preferably 90 and above.   - recommend outpatient sleep study Hypoxic on admission in setting of COVID infection. Suspect underlying lung pathology, such as undiagnosed sleep apnea based on collateral hx from pt and her family. Pt appears comfortable despite hypoxia. Noted significant increase in D-dimer  - monitor oxygen saturations closely  - continue with supplemental O2 to maintain O2 sat's 88% and above, preferably 90 and above.   - recommend outpatient sleep study  - obtain CT angio chest to r/o acute PE

## 2021-03-21 NOTE — PROGRESS NOTE ADULT - ASSESSMENT
76F, Bel[place]ian-speaking, with hx of aortic stenosis s/p porcine valve replacement (10 years ago), HTN, HLD, CKD who presents with SOB and cough a/w acute hypoxic respiratory failure 2/2 COVID PNA. Course c/b SKY. SKY has resolved, but pt still with high O2 requirements

## 2021-03-21 NOTE — PROGRESS NOTE ADULT - PROBLEM SELECTOR PLAN 2
Hypoxic on admission. Completed 5-day course of Remdesivir on 3/17/21  - continue dexamethasone; currently on day 9 of 10  - monitor inflammatory markers [id number], oxygen sats  - VTE ppx: lovenox 40mg BID Hypoxic on admission. Completed 5-day course of Remdesivir on 3/17/21  - continue dexamethasone; currently on day 10 of 10 --> would consider extending course of decrease to prednisone  - monitor inflammatory markers [id number], oxygen sats  - VTE ppx: lovenox 40mg BID

## 2021-03-22 NOTE — PROGRESS NOTE ADULT - PROBLEM SELECTOR PLAN 3
SCr. peaked at 1.5 (baseline 0.6-0.8). Now with recurrent episode with increase in BUN/Cr, likely due to inadequate oral intake and re-initiation of home regimen of ARB  - start normal saline @ 100cc/hr for 1 day; encourage oral intake as tolerated  - continue to hold lasix 40mg daily and HCTZ 12.5mg daily SCr. peaked at 1.5 (baseline 0.6-0.8). Now with recurrent episode with increase in BUN/Cr, likely due to inadequate oral intake and re-initiation of home regimen of ARB  - resolved s/p IVF   - continue to hold lasix 40mg daily and HCTZ 12.5mg daily

## 2021-03-22 NOTE — PROGRESS NOTE ADULT - PROBLEM SELECTOR PLAN 1
Hypoxic on admission in setting of COVID infection. Suspect underlying lung pathology, such as undiagnosed sleep apnea based on collateral hx from pt and her family. Pt appears comfortable despite hypoxia. Noted significant increase in D-dimer  - monitor oxygen saturations closely  - continue with supplemental O2 to maintain O2 sat's 88% and above, preferably 90 and above.   - recommend outpatient sleep study  - obtain CT angio chest to r/o acute PE Hypoxic on admission in setting of COVID infection. Suspect underlying lung pathology, such as undiagnosed sleep apnea based on collateral hx from pt and her family. Pt appears comfortable despite hypoxia. Noted significant increase in D-dimer yesterday , trended down today 1309-->315   - monitor oxygen saturations closely  - continue with supplemental O2 to maintain O2 sat's 88% and above, preferably 90 and above.   - recommend outpatient sleep study  - obtain CT angio chest to r/o acute PE, doppler LE negative for DVT  - c/w Lovenox 40 mg BID

## 2021-03-22 NOTE — PROGRESS NOTE ADULT - PROBLEM SELECTOR PLAN 7
BP better controlled this AM  - c/w metoprolol,   - c/w Valsartan 320mg daily.   - Hold Lasix and HCTZ  - monitor BP BP moderately controlled  - c/w metoprolol,   - c/w Valsartan 320mg daily.   - Hold Lasix and HCTZ  - monitor BP

## 2021-03-22 NOTE — PROGRESS NOTE ADULT - ASSESSMENT
76F, Romanian-speaking, with hx of aortic stenosis s/p porcine valve replacement (10 years ago), HTN, HLD, CKD who presents with SOB and cough a/w acute hypoxic respiratory failure 2/2 COVID PNA. Course c/b SKY. SKY has resolved, but pt still with high O2 requirements

## 2021-03-22 NOTE — PROGRESS NOTE ADULT - ATTENDING COMMENTS
Plan of care d/w granddaughter on speaker phone with patient , granddaughter also translating , pt encouraged to use incentive spirometer  Granddaughter wants to facetime patient , pt's nurse notified  PT charles noted, recommend rehab

## 2021-03-22 NOTE — PROGRESS NOTE ADULT - PROBLEM SELECTOR PLAN 2
Hypoxic on admission. Completed 5-day course of Remdesivir on 3/17/21  - continue dexamethasone; currently on day 10 of 10 --> would consider extending course of decrease to prednisone  - monitor inflammatory markers [id number], oxygen sats  - VTE ppx: lovenox 40mg BID

## 2021-03-22 NOTE — PROGRESS NOTE ADULT - SUBJECTIVE AND OBJECTIVE BOX
Patient is a 76y old  Female who presents with a chief complaint of Acute hypoxic respiratory failure 2/2 COVID (21 Mar 2021 11:01)      SUBJECTIVE / OVERNIGHT EVENTS:    MEDICATIONS  (STANDING):  ALPRAZolam 0.25 milliGRAM(s) Oral three times a day  aspirin enteric coated 81 milliGRAM(s) Oral daily  citalopram 20 milliGRAM(s) Oral at bedtime  enoxaparin Injectable 40 milliGRAM(s) SubCutaneous two times a day  metoprolol succinate ER 50 milliGRAM(s) Oral daily  polyethylene glycol 3350 17 Gram(s) Oral two times a day  QUEtiapine 100 milliGRAM(s) Oral at bedtime  senna 2 Tablet(s) Oral at bedtime  sodium chloride 0.9%. 1000 milliLiter(s) (100 mL/Hr) IV Continuous <Continuous>  sodium phosphate IVPB 15 milliMole(s) IV Intermittent once  valsartan 320 milliGRAM(s) Oral daily    MEDICATIONS  (PRN):  acetaminophen   Tablet .. 650 milliGRAM(s) Oral every 6 hours PRN Mild Pain (1 - 3)  guaiFENesin   Syrup  (Sugar-Free) 100 milliGRAM(s) Oral every 6 hours PRN Cough  oxyCODONE    IR 5 milliGRAM(s) Oral every 6 hours PRN Severe Pain (7 - 10)  oxyCODONE    IR 2.5 milliGRAM(s) Oral every 6 hours PRN Moderate Pain (4 - 6)      Vital Signs Last 24 Hrs  T(C): 36.3 (22 Mar 2021 07:18), Max: 36.7 (21 Mar 2021 17:37)  T(F): 97.4 (22 Mar 2021 07:18), Max: 98 (21 Mar 2021 17:37)  HR: 71 (22 Mar 2021 07:18) (71 - 77)  BP: 154/63 (22 Mar 2021 07:18) (121/67 - 159/87)  BP(mean): --  RR: 18 (22 Mar 2021 07:18) (18 - 19)  SpO2: 92% (22 Mar 2021 07:18) (92% - 96%)  CAPILLARY BLOOD GLUCOSE        I&O's Summary      PHYSICAL EXAM:  GENERAL: NAD, well-developed  HEAD:  Atraumatic, Normocephalic  EYES: EOMI, PERRLA, conjunctiva and sclera clear  NECK: Supple, No JVD  CHEST/LUNG: Clear to auscultation bilaterally; No wheeze  HEART: Regular rate and rhythm; No murmurs, rubs, or gallops  ABDOMEN: Soft, Nontender, Nondistended; Bowel sounds present  EXTREMITIES:  2+ Peripheral Pulses, No clubbing, cyanosis, or edema  PSYCH: AAOx3  NEUROLOGY: non-focal  SKIN: No rashes or lesions    LABS:                        12.7   10.45 )-----------( 251      ( 22 Mar 2021 08:21 )             42.6     03-22    141  |  104  |  21  ----------------------------<  81  5.1   |  27  |  0.71    Ca    10.2      22 Mar 2021 08:21  Phos  2.3     03-22  Mg     2.1     03-22                RADIOLOGY & ADDITIONAL TESTS:    Imaging Personally Reviewed:    Consultant(s) Notes Reviewed:      Care Discussed with Consultants/Other Providers:   Patient is a 76y old  Female who presents with a chief complaint of Acute hypoxic respiratory failure 2/2 COVID (21 Mar 2021 11:01)      SUBJECTIVE / OVERNIGHT EVENTS: patient seen and examined by bedside, pt very anxious, wants to speak to granddaughter called granddaughter Jo, pt denies headache, dizziness, SOB, CP, Palpitations , N/V/D, abdominal pain   pt told granddaughter she wants to go home   pt on 15 l O2 via NRB       MEDICATIONS  (STANDING):  ALPRAZolam 0.25 milliGRAM(s) Oral three times a day  aspirin enteric coated 81 milliGRAM(s) Oral daily  citalopram 20 milliGRAM(s) Oral at bedtime  enoxaparin Injectable 40 milliGRAM(s) SubCutaneous two times a day  metoprolol succinate ER 50 milliGRAM(s) Oral daily  polyethylene glycol 3350 17 Gram(s) Oral two times a day  QUEtiapine 100 milliGRAM(s) Oral at bedtime  senna 2 Tablet(s) Oral at bedtime  sodium chloride 0.9%. 1000 milliLiter(s) (100 mL/Hr) IV Continuous <Continuous>  sodium phosphate IVPB 15 milliMole(s) IV Intermittent once  valsartan 320 milliGRAM(s) Oral daily    MEDICATIONS  (PRN):  acetaminophen   Tablet .. 650 milliGRAM(s) Oral every 6 hours PRN Mild Pain (1 - 3)  guaiFENesin   Syrup  (Sugar-Free) 100 milliGRAM(s) Oral every 6 hours PRN Cough  oxyCODONE    IR 5 milliGRAM(s) Oral every 6 hours PRN Severe Pain (7 - 10)  oxyCODONE    IR 2.5 milliGRAM(s) Oral every 6 hours PRN Moderate Pain (4 - 6)      Vital Signs Last 24 Hrs  T(C): 36.3 (22 Mar 2021 07:18), Max: 36.7 (21 Mar 2021 17:37)  T(F): 97.4 (22 Mar 2021 07:18), Max: 98 (21 Mar 2021 17:37)  HR: 71 (22 Mar 2021 07:18) (71 - 77)  BP: 154/63 (22 Mar 2021 07:18) (121/67 - 159/87)  BP(mean): --  RR: 18 (22 Mar 2021 07:18) (18 - 19)  SpO2: 92% (22 Mar 2021 07:18) (92% - 96%)  CAPILLARY BLOOD GLUCOSE        I&O's Summary      PHYSICAL EXAM:  GENERAL: anxious appearing ,  morbidly obese, on NRB mask at 15L  HEAD:  Atraumatic, Normocephalic  EYES: EOMI, PERRLA, conjunctiva and sclera clear  CHEST/LUNG: Clear to auscultation bilaterally; No wheeze  HEART: Regular rate and rhythm; No murmurs, rubs, or gallops  ABDOMEN: Soft, Nontender, Nondistended; Bowel sounds present  EXTREMITIES:  2+ Peripheral Pulses, No clubbing, cyanosis, b/l LE non-pitting edema  PSYCH: AAOx3, anxious   NEUROLOGY: non-focal      LABS:                        12.7   10.45 )-----------( 251      ( 22 Mar 2021 08:21 )             42.6     03-22    141  |  104  |  21  ----------------------------<  81  5.1   |  27  |  0.71    Ca    10.2      22 Mar 2021 08:21  Phos  2.3     03-22  Mg     2.1     03-22                RADIOLOGY & ADDITIONAL TESTS:  < from: US Duplex Venous Lower Ext Complete, Bilateral (03.21.21 @ 18:50) >    Evaluation degraded due to patient body habitus.    RIGHT:  Normal compressibility of the RIGHT common femoral, femoral and popliteal veins.  Doppler examination shows normal spontaneous and phasic flow.  Right calf veins are not well visualized.    LEFT:  Normal compressibility of the LEFT common femoral, femoral and popliteal veins.  Doppler examination shows normal spontaneous and phasic flow.  Left calf veins are not well visualized.    IMPRESSION:  No evidence of deep venous thrombosis in either lower extremity.      < end of copied text >    Imaging Personally Reviewed:    Consultant(s) Notes Reviewed:      Care Discussed with Consultants/Other Providers:

## 2021-03-23 NOTE — PROGRESS NOTE ADULT - ASSESSMENT
76F, [place]-speaking, with hx of aortic stenosis s/p porcine valve replacement (10 years ago), HTN, HLD, CKD who presents with SOB and cough a/w acute hypoxic respiratory failure 2/2 COVID PNA. Course c/b SKY. SKY has resolved, but pt still with high O2 requirements

## 2021-03-23 NOTE — DIETITIAN INITIAL EVALUATION ADULT. - ADD RECOMMEND
1. Continue current diet order, which remains appropriate at this time. 2. Consider SLP swallow eval given hx of swallowing difficulty PTA reported by family. 3. Will send H[employer] vital 500 shake x2 daily (1040kcal, 44g protein). 4. Monitor weights, labs, BM's, skin integrity, PO intake/tolerance. 5. Encourage po intake, assist with meals and menu selections, provide alternatives PRN. 6. Aspiration precaution.

## 2021-03-23 NOTE — DIETITIAN INITIAL EVALUATION ADULT. - PERTINENT MEDS FT
MEDICATIONS  (STANDING):  ALPRAZolam 0.25 milliGRAM(s) Oral three times a day  aspirin enteric coated 81 milliGRAM(s) Oral daily  citalopram 20 milliGRAM(s) Oral at bedtime  enoxaparin Injectable 40 milliGRAM(s) SubCutaneous two times a day  furosemide   Injectable 40 milliGRAM(s) IV Push once  metoprolol succinate ER 50 milliGRAM(s) Oral daily  polyethylene glycol 3350 17 Gram(s) Oral two times a day  QUEtiapine 100 milliGRAM(s) Oral at bedtime  senna 2 Tablet(s) Oral at bedtime  sodium chloride 0.9%. 1000 milliLiter(s) (100 mL/Hr) IV Continuous <Continuous>  valsartan 320 milliGRAM(s) Oral daily    MEDICATIONS  (PRN):  acetaminophen   Tablet .. 650 milliGRAM(s) Oral every 6 hours PRN Mild Pain (1 - 3)  guaiFENesin   Syrup  (Sugar-Free) 100 milliGRAM(s) Oral every 6 hours PRN Cough  oxyCODONE    IR 5 milliGRAM(s) Oral every 6 hours PRN Severe Pain (7 - 10)  oxyCODONE    IR 2.5 milliGRAM(s) Oral every 6 hours PRN Moderate Pain (4 - 6)

## 2021-03-23 NOTE — DIETITIAN INITIAL EVALUATION ADULT. - ETIOLOGY
decreased appetite 2/2 covid infection, ?swallowing difficulty lack of prior diet education; inadequate physical activity; chronic LE edema

## 2021-03-23 NOTE — PROGRESS NOTE ADULT - PROBLEM SELECTOR PLAN 9
- lovenox 40mg BID - lovenox 40mg BID  tried calling granddaughter , did not pick, unable to leave message as mailbox full

## 2021-03-23 NOTE — PROGRESS NOTE ADULT - PROBLEM SELECTOR PLAN 7
BP moderately controlled  - c/w metoprolol,   - c/w Valsartan 320mg daily.   - Hold Lasix and HCTZ  - monitor BP

## 2021-03-23 NOTE — DIETITIAN INITIAL EVALUATION ADULT. - OTHER INFO
Per chart: 76F, [place]ic-speaking, with hx of aortic stenosis s/p porcine valve replacement (10 years ago), HTN, HLD, CKD who presents with SOB and cough a/w acute hypoxic respiratory failure 2/2 COVID PNA. Course c/b SKY. SKY has resolved, but pt still with high O2 requirements.    RDN communicated with pt's family (granddaughter Jo and daughter Zahira) via phone [phone]. Per family, patient had decreased appetite and poor PO intake since COVID +, but reports patient normally "eats little" with early satiety at home even before COVID +, usually eats what family prepares at home, not on any oral nutrition supplement before. Family endorses patient used to have occasional episodes of choking on eating pudding and bread, but not on liquids, few years ago but not recently. On regular solids/liquids at home. Noted patient tolerated current diet consistency per MD and nursing, no reports of chewing/swallowing difficulties on current diet order at this time, but patient did c/o poor appetite on in-house meals to family. Family is bring food from home for patient (chicken and mashed potatoes). Family confirms NKFA, likes mashed potatoes, also likes jello and fruit cup per chart -- honored. No N/V/C/D at this time, noted previously c/o constipation, +BM s/p enema per chart review, on senna and miralax per MD order. Family is amenable to add oral nutrition supplement for patient to optimize her po intake. All questions were answered at this time.    Family reports UBW ~190# and denies recent wt loss PTA, reports Ht=4'8" (142.24cm).     Skin: No pressure injuries.  Edema: L+R foot 1+ per flowsheet.

## 2021-03-23 NOTE — CONSULT NOTE ADULT - SUBJECTIVE AND OBJECTIVE BOX
CHIEF COMPLAINT:    HPI:  75 yo F PMH HTn, HLD, AS s/p AVR, MDD, hx of DVT, and obesity admitted for hypoxemic respiratory failure 2/2 covid PNA. Initially symptomatic week of 3/12 w/ cough, myaligia, and nausea. Tested positive at urgent care on 3/12 and referred to ED due to hypoxemia of 85% on RA. Upon arrival to ED noted to be AF and normotensive though with hypoxemia requiring 4LNC. CXR w/ bilateral infiltrates. Tx with remdesivir and decadron. Noted to have worsening hypoxemia and eventually titrated to NRB and intermittently weaned to 6LNC. Labs notable for rising inflammatory markers though with negative LE duplex on 3/21 and neg CTPE on 3/23. Pulmonary consulted for persistent hypoxemia.     PAST MEDICAL & SURGICAL HISTORY:  Edema, lower extremity    MDD (major depressive disorder)    History of aortic valve repair    Aortic stenosis    Hyperlipidemia    HTN (hypertension)    History of aortic valve repair    History of appendectomy        FAMILY HISTORY:  FH: HTN (hypertension)        SOCIAL HISTORY:  Smoking: [ ] Never Smoked [ ] Former Smoker (__ packs x ___ years) [ ] Current Smoker  (__ packs x ___ years)  Substance Use: [ ] Never Used [ ] Used ____  EtOH Use:  Marital Status: [ ] Single [ ]  [ ]  [ ]   Sexual History:   Occupation:  Recent Travel:  Country of Birth:  Advance Directives:    Allergies    No Known Allergies    Intolerances        HOME MEDICATIONS:  Home Medications:  ALPRAZolam 0.25 mg oral tablet: 1 tab(s) orally 3 times a day, As Needed (12 Mar 2021 18:26)  Aspirin Enteric Coated 81 mg oral delayed release tablet: 1 tab(s) orally once a day (12 Mar 2021 18:26)  citalopram 20 mg oral tablet: 1 tab(s) orally once a day (12 Mar 2021 18:26)  Crestor 20 mg oral tablet: 1 tab(s) orally once a day (12 Mar 2021 18:26)  Lasix 40 mg oral tablet: 1 tab(s) orally once a day (12 Mar 2021 18:26)  metoprolol succinate 50 mg oral tablet, extended release: 1 tab(s) orally once a day (12 Mar 2021 18:26)  SEROquel 100 mg oral tablet: 1 tab(s) orally once a day (at bedtime) (12 Mar 2021 18:26)  valsartan-hydrochlorothiazide 320mg-12.5mg oral tablet: 1 tab(s) orally once a day (12 Mar 2021 18:26)      REVIEW OF SYSTEMS:  Constitutional: [ ] negative [ ] fevers [ ] chills [ ] weight loss [ ] weight gain  HEENT: [ ] negative [ ] dry eyes [ ] eye irritation [ ] postnasal drip [ ] nasal congestion  CV: [ ] negative  [ ] chest pain [ ] orthopnea [ ] palpitations [ ] murmur  Resp: [ ] negative [ ] cough [ ] shortness of breath [ ] dyspnea [ ] wheezing [ ] sputum [ ] hemoptysis  GI: [ ] negative [ ] nausea [ ] vomiting [ ] diarrhea [ ] constipation [ ] abd pain [ ] dysphagia   : [ ] negative [ ] dysuria [ ] nocturia [ ] hematuria [ ] increased urinary frequency  Musculoskeletal: [ ] negative [ ] back pain [ ] myalgias [ ] arthralgias [ ] fracture  Skin: [ ] negative [ ] rash [ ] itch  Neurological: [ ] negative [ ] headache [ ] dizziness [ ] syncope [ ] weakness [ ] numbness  Psychiatric: [ ] negative [ ] anxiety [ ] depression  Endocrine: [ ] negative [ ] diabetes [ ] thyroid problem  Hematologic/Lymphatic: [ ] negative [ ] anemia [ ] bleeding problem  Allergic/Immunologic: [ ] negative [ ] itchy eyes [ ] nasal discharge [ ] hives [ ] angioedema  [ ] All other systems negative  [ ] Unable to assess ROS because ________    OBJECTIVE:  ICU Vital Signs Last 24 Hrs  T(C): 36.7 (23 Mar 2021 05:39), Max: 36.7 (23 Mar 2021 05:39)  T(F): 98 (23 Mar 2021 05:39), Max: 98 (23 Mar 2021 05:39)  HR: 89 (23 Mar 2021 05:39) (85 - 89)  BP: 154/83 (23 Mar 2021 05:39) (148/80 - 154/83)  BP(mean): --  ABP: --  ABP(mean): --  RR: 19 (23 Mar 2021 05:39) (18 - 19)  SpO2: 94% (23 Mar 2021 05:39) (93% - 94%)        03-22 @ 07:01  -  03-23 @ 07:00  --------------------------------------------------------  IN: 600 mL / OUT: 1300 mL / NET: -700 mL      CAPILLARY BLOOD GLUCOSE          PHYSICAL EXAM:  General:   HEENT:   Lymph Nodes:  Neck:   Respiratory:   Cardiovascular:   Abdomen:   Extremities:   Skin:   Neurological:  Psychiatry:    HOSPITAL MEDICATIONS:  Standing Meds:  ALPRAZolam 0.25 milliGRAM(s) Oral three times a day  aspirin enteric coated 81 milliGRAM(s) Oral daily  citalopram 20 milliGRAM(s) Oral at bedtime  enoxaparin Injectable 40 milliGRAM(s) SubCutaneous two times a day  metoprolol succinate ER 50 milliGRAM(s) Oral daily  polyethylene glycol 3350 17 Gram(s) Oral two times a day  QUEtiapine 100 milliGRAM(s) Oral at bedtime  senna 2 Tablet(s) Oral at bedtime  sodium chloride 0.9%. 1000 milliLiter(s) IV Continuous <Continuous>  valsartan 320 milliGRAM(s) Oral daily      PRN Meds:  acetaminophen   Tablet .. 650 milliGRAM(s) Oral every 6 hours PRN  guaiFENesin   Syrup  (Sugar-Free) 100 milliGRAM(s) Oral every 6 hours PRN  oxyCODONE    IR 5 milliGRAM(s) Oral every 6 hours PRN  oxyCODONE    IR 2.5 milliGRAM(s) Oral every 6 hours PRN      LABS:                        12.0   11.77 )-----------( 223      ( 23 Mar 2021 07:58 )             40.0     Hgb Trend: 12.0<--, 12.7<--, 13.0<--, 13.0<--, 12.3<--  03-23    136  |  100  |  17  ----------------------------<  86  3.8   |  26  |  0.82    Ca    9.7      23 Mar 2021 07:58  Phos  2.7     03-23  Mg     1.8     03-23      Creatinine Trend: 0.82<--, 0.71<--, 0.76<--, 1.29<--, 0.89<--, 0.81<--            MICROBIOLOGY:       RADIOLOGY:  [ ] Reviewed and interpreted by me    PULMONARY FUNCTION TESTS:    EKG:   CHIEF COMPLAINT:    HPI:  77 yo F PMH HTn, HLD, AS s/p AVR, MDD, hx of DVT, and obesity admitted for hypoxemic respiratory failure 2/2 covid PNA. Initially symptomatic week of 3/12 w/ cough, myaligia, and nausea. Tested positive at urgent care on 3/12 and referred to ED due to hypoxemia of 85% on RA. Upon arrival to ED noted to be AF and normotensive though with hypoxemia requiring 4LNC. CXR w/ bilateral infiltrates. Tx with remdesivir and decadron. Noted to have worsening hypoxemia and eventually titrated to NRB and intermittently weaned to 6LNC. Labs notable for rising inflammatory markers though with negative LE duplex on 3/21 and neg CTPE on 3/23. Pulmonary consulted for persistent hypoxemia.     PAST MEDICAL & SURGICAL HISTORY:  Edema, lower extremity    MDD (major depressive disorder)    History of aortic valve repair    Aortic stenosis    Hyperlipidemia    HTN (hypertension)    History of aortic valve repair    History of appendectomy        FAMILY HISTORY:  FH: HTN (hypertension)        SOCIAL HISTORY:  Smoking: [ ] Never Smoked [ ] Former Smoker (__ packs x ___ years) [ ] Current Smoker  (__ packs x ___ years)  Substance Use: [ ] Never Used [ ] Used ____  EtOH Use:  Marital Status: [ ] Single [ ]  [ ]  [ ]   Sexual History:   Occupation:  Recent Travel:  Country of Birth:  Advance Directives:    Allergies    No Known Allergies    Intolerances        HOME MEDICATIONS:  Home Medications:  ALPRAZolam 0.25 mg oral tablet: 1 tab(s) orally 3 times a day, As Needed (12 Mar 2021 18:26)  Aspirin Enteric Coated 81 mg oral delayed release tablet: 1 tab(s) orally once a day (12 Mar 2021 18:26)  citalopram 20 mg oral tablet: 1 tab(s) orally once a day (12 Mar 2021 18:26)  Crestor 20 mg oral tablet: 1 tab(s) orally once a day (12 Mar 2021 18:26)  Lasix 40 mg oral tablet: 1 tab(s) orally once a day (12 Mar 2021 18:26)  metoprolol succinate 50 mg oral tablet, extended release: 1 tab(s) orally once a day (12 Mar 2021 18:26)  SEROquel 100 mg oral tablet: 1 tab(s) orally once a day (at bedtime) (12 Mar 2021 18:26)  valsartan-hydrochlorothiazide 320mg-12.5mg oral tablet: 1 tab(s) orally once a day (12 Mar 2021 18:26)      REVIEW OF SYSTEMS:  Constitutional: [ ] negative [ ] fevers [ ] chills [ ] weight loss [ ] weight gain  HEENT: [ ] negative [ ] dry eyes [ ] eye irritation [ ] postnasal drip [ ] nasal congestion  CV: [ ] negative  [ ] chest pain [ ] orthopnea [ ] palpitations [ ] murmur  Resp: [ ] negative [ ] cough [ ] shortness of breath [ ] dyspnea [ ] wheezing [ ] sputum [ ] hemoptysis  GI: [ ] negative [ ] nausea [ ] vomiting [ ] diarrhea [ ] constipation [ ] abd pain [ ] dysphagia   : [ ] negative [ ] dysuria [ ] nocturia [ ] hematuria [ ] increased urinary frequency  Musculoskeletal: [ ] negative [ ] back pain [ ] myalgias [ ] arthralgias [ ] fracture  Skin: [ ] negative [ ] rash [ ] itch  Neurological: [ ] negative [ ] headache [ ] dizziness [ ] syncope [ ] weakness [ ] numbness  Psychiatric: [ ] negative [ ] anxiety [ ] depression  Endocrine: [ ] negative [ ] diabetes [ ] thyroid problem  Hematologic/Lymphatic: [ ] negative [ ] anemia [ ] bleeding problem  Allergic/Immunologic: [ ] negative [ ] itchy eyes [ ] nasal discharge [ ] hives [ ] angioedema  [ ] All other systems negative  [x] Unable to assess ROS because ____patient not participating in conversation, only requesting to sit in a chair____    OBJECTIVE:  ICU Vital Signs Last 24 Hrs  T(C): 36.7 (23 Mar 2021 05:39), Max: 36.7 (23 Mar 2021 05:39)  T(F): 98 (23 Mar 2021 05:39), Max: 98 (23 Mar 2021 05:39)  HR: 89 (23 Mar 2021 05:39) (85 - 89)  BP: 154/83 (23 Mar 2021 05:39) (148/80 - 154/83)  BP(mean): --  ABP: --  ABP(mean): --  RR: 19 (23 Mar 2021 05:39) (18 - 19)  SpO2: 94% (23 Mar 2021 05:39) (93% - 94%)        03-22 @ 07:01  -  03-23 @ 07:00  --------------------------------------------------------  IN: 600 mL / OUT: 1300 mL / NET: -700 mL      CAPILLARY BLOOD GLUCOSE          PHYSICAL EXAM:  GEN: Elderly female  HEENT: EOMI  CV: Regular  PULM: Bilateral rhonchi/crackles, no wheezes  ABD: Soft  EXT: [facility] MEDICATIONS:  Standing Meds:  ALPRAZolam 0.25 milliGRAM(s) Oral three times a day  aspirin enteric coated 81 milliGRAM(s) Oral daily  citalopram 20 milliGRAM(s) Oral at bedtime  enoxaparin Injectable 40 milliGRAM(s) SubCutaneous two times a day  metoprolol succinate ER 50 milliGRAM(s) Oral daily  polyethylene glycol 3350 17 Gram(s) Oral two times a day  QUEtiapine 100 milliGRAM(s) Oral at bedtime  senna 2 Tablet(s) Oral at bedtime  sodium chloride 0.9%. 1000 milliLiter(s) IV Continuous <Continuous>  valsartan 320 milliGRAM(s) Oral daily      PRN Meds:  acetaminophen   Tablet .. 650 milliGRAM(s) Oral every 6 hours PRN  guaiFENesin   Syrup  (Sugar-Free) 100 milliGRAM(s) Oral every 6 hours PRN  oxyCODONE    IR 5 milliGRAM(s) Oral every 6 hours PRN  oxyCODONE    IR 2.5 milliGRAM(s) Oral every 6 hours PRN      LABS:                        12.0   11.77 )-----------( 223      ( 23 Mar 2021 07:58 )             40.0     Hgb Trend: 12.0<--, 12.7<--, 13.0<--, 13.0<--, 12.3<--  03-23    136  |  100  |  17  ----------------------------<  86  3.8   |  26  |  0.82    Ca    9.7      23 Mar 2021 07:58  Phos  2.7     03-23  Mg     1.8     03-23      Creatinine Trend: 0.82<--, 0.71<--, 0.76<--, 1.29<--, 0.89<--, 0.81<--            MICROBIOLOGY:       RADIOLOGY:  [ ] Reviewed and interpreted by me    PULMONARY FUNCTION TESTS:    EKG:

## 2021-03-23 NOTE — PROGRESS NOTE ADULT - PROBLEM SELECTOR PLAN 3
SCr. peaked at 1.5 (baseline 0.6-0.8). Now with recurrent episode with increase in BUN/Cr, likely due to inadequate oral intake and re-initiation of home regimen of ARB  - resolved s/p IVF   - continue to hold lasix 40mg daily and HCTZ 12.5mg daily

## 2021-03-23 NOTE — CONSULT NOTE ADULT - ASSESSMENT
75 yo F PMH HTn, HLD, AS s/p AVR, MDD, hx of DVT, and obesity admitted for hypoxemic respiratory failure 2/2 covid PNA. Pulmonary consulted for hypoxemic respiratory failure    - desaturations to low/mid 80s on 100/60HFNC on exam today with slow resaturations to low 90s  - CTPE 3/23 without embolus, though limited evaluation of distant segments 2/2 motion  - negative duplex 3/21  - procal 0.06, low s/f superimposed bacterial pna  - s/p remdesivir and dexamethasone  - c/f component of overload, off diuresis since 3/14, would trial diuresis  - pt remains tenuous from pulmonary standpoint, low threshold for MICU consultation if worsening; recommend [facility]    Jose Matute MD  PGY-4  PCCM Fellow  Pager [phone] 75 yo F PMH HTn, HLD, AS s/p AVR, MDD, hx of DVT, and obesity admitted for hypoxemic respiratory failure 2/2 covid PNA. Pulmonary consulted for hypoxemic respiratory failure    - desaturations to low/mid 80s on 100/60HFNC on exam today with slow resaturations to low 90s  - CTPE 3/23 without embolus, though limited evaluation of distal segments 2/2 motion  - negative duplex 3/21  - procal 0.06, low s/f superimposed bacterial pna  - s/p remdesivir and dexamethasone  - c/f component of overload, off diuresis since 3/14, would trial diuresis  - pt remains tenuous from pulmonary standpoint, low threshold for MICU consultation if worsening; recommend [facility]    Jose Matute MD  PGY-4  PCCM Fellow  Pager [phone]

## 2021-03-23 NOTE — PROGRESS NOTE ADULT - SUBJECTIVE AND OBJECTIVE BOX
Patient is a 76y old  Female who presents with a chief complaint of dyspnea (22 Mar 2021 10:57)      SUBJECTIVE / OVERNIGHT EVENTS:    MEDICATIONS  (STANDING):  ALPRAZolam 0.25 milliGRAM(s) Oral three times a day  aspirin enteric coated 81 milliGRAM(s) Oral daily  citalopram 20 milliGRAM(s) Oral at bedtime  enoxaparin Injectable 40 milliGRAM(s) SubCutaneous two times a day  metoprolol succinate ER 50 milliGRAM(s) Oral daily  polyethylene glycol 3350 17 Gram(s) Oral two times a day  QUEtiapine 100 milliGRAM(s) Oral at bedtime  senna 2 Tablet(s) Oral at bedtime  sodium chloride 0.9%. 1000 milliLiter(s) (100 mL/Hr) IV Continuous <Continuous>  valsartan 320 milliGRAM(s) Oral daily    MEDICATIONS  (PRN):  acetaminophen   Tablet .. 650 milliGRAM(s) Oral every 6 hours PRN Mild Pain (1 - 3)  guaiFENesin   Syrup  (Sugar-Free) 100 milliGRAM(s) Oral every 6 hours PRN Cough  oxyCODONE    IR 5 milliGRAM(s) Oral every 6 hours PRN Severe Pain (7 - 10)  oxyCODONE    IR 2.5 milliGRAM(s) Oral every 6 hours PRN Moderate Pain (4 - 6)      Vital Signs Last 24 Hrs  T(C): 36.7 (23 Mar 2021 05:39), Max: 36.7 (23 Mar 2021 05:39)  T(F): 98 (23 Mar 2021 05:39), Max: 98 (23 Mar 2021 05:39)  HR: 89 (23 Mar 2021 05:39) (79 - 89)  BP: 154/83 (23 Mar 2021 05:39) (145/65 - 154/83)  BP(mean): --  RR: 19 (23 Mar 2021 05:39) (18 - 19)  SpO2: 94% (23 Mar 2021 05:39) (93% - 95%)  CAPILLARY BLOOD GLUCOSE        I&O's Summary    22 Mar 2021 07:01  -  23 Mar 2021 07:00  --------------------------------------------------------  IN: 600 mL / OUT: 1300 mL / NET: -700 mL        PHYSICAL EXAM:  GENERAL: NAD, well-developed  HEAD:  Atraumatic, Normocephalic  EYES: EOMI, PERRLA, conjunctiva and sclera clear  NECK: Supple, No JVD  CHEST/LUNG: Clear to auscultation bilaterally; No wheeze  HEART: Regular rate and rhythm; No murmurs, rubs, or gallops  ABDOMEN: Soft, Nontender, Nondistended; Bowel sounds present  EXTREMITIES:  2+ Peripheral Pulses, No clubbing, cyanosis, or edema  PSYCH: AAOx3  NEUROLOGY: non-focal  SKIN: No rashes or lesions    LABS:                        12.0   11.77 )-----------( 223      ( 23 Mar 2021 07:58 )             40.0     03-23    136  |  100  |  17  ----------------------------<  86  3.8   |  26  |  0.82    Ca    9.7      23 Mar 2021 07:58  Phos  2.7     03-23  Mg     1.8     03-23                RADIOLOGY & ADDITIONAL TESTS:    Imaging Personally Reviewed:    Consultant(s) Notes Reviewed:      Care Discussed with Consultants/Other Providers:   Patient is a 76y old  Female who presents with a chief complaint of dyspnea (22 Mar 2021 10:57)      SUBJECTIVE / OVERNIGHT EVENTS: patient seen and examined by bedside, pt c/o headache, difficulty breathing, denies, dizziness,  CP, Palpitations , N/V/D, abdominal pain  pt had a Bm today   pt saturating 93-94% on 15L O2 via NRB mask, was desaturating to 80s when mask removed during exam   history obtained with Scottish translation (pacific  ID# [id number]   )         MEDICATIONS  (STANDING):  ALPRAZolam 0.25 milliGRAM(s) Oral three times a day  aspirin enteric coated 81 milliGRAM(s) Oral daily  citalopram 20 milliGRAM(s) Oral at bedtime  enoxaparin Injectable 40 milliGRAM(s) SubCutaneous two times a day  metoprolol succinate ER 50 milliGRAM(s) Oral daily  polyethylene glycol 3350 17 Gram(s) Oral two times a day  QUEtiapine 100 milliGRAM(s) Oral at bedtime  senna 2 Tablet(s) Oral at bedtime  sodium chloride 0.9%. 1000 milliLiter(s) (100 mL/Hr) IV Continuous <Continuous>  valsartan 320 milliGRAM(s) Oral daily    MEDICATIONS  (PRN):  acetaminophen   Tablet .. 650 milliGRAM(s) Oral every 6 hours PRN Mild Pain (1 - 3)  guaiFENesin   Syrup  (Sugar-Free) 100 milliGRAM(s) Oral every 6 hours PRN Cough  oxyCODONE    IR 5 milliGRAM(s) Oral every 6 hours PRN Severe Pain (7 - 10)  oxyCODONE    IR 2.5 milliGRAM(s) Oral every 6 hours PRN Moderate Pain (4 - 6)      Vital Signs Last 24 Hrs  T(C): 36.7 (23 Mar 2021 05:39), Max: 36.7 (23 Mar 2021 05:39)  T(F): 98 (23 Mar 2021 05:39), Max: 98 (23 Mar 2021 05:39)  HR: 89 (23 Mar 2021 05:39) (79 - 89)  BP: 154/83 (23 Mar 2021 05:39) (145/65 - 154/83)  BP(mean): --  RR: 19 (23 Mar 2021 05:39) (18 - 19)  SpO2: 94% (23 Mar 2021 05:39) (93% - 95%)  CAPILLARY BLOOD GLUCOSE        I&O's Summary    22 Mar 2021 07:01  -  23 Mar 2021 07:00  --------------------------------------------------------  IN: 600 mL / OUT: 1300 mL / NET: -700 mL        PHYSICAL EXAM:  GENERAL: anxious appearing ,  morbidly obese, on NRB mask at 15L  HEAD:  Atraumatic, Normocephalic  EYES: EOMI, PERRLA, conjunctiva and sclera clear  CHEST/LUNG: Clear to auscultation bilaterally; No wheeze  HEART: Regular rate and rhythm; No murmurs, rubs, or gallops  ABDOMEN: Soft, Nontender, Nondistended; Bowel sounds present  EXTREMITIES:  2+ Peripheral Pulses, No clubbing, cyanosis, b/l LE non-pitting edema  PSYCH: AAOx3, anxious   NEUROLOGY: non-focal      LABS:                        12.0   11.77 )-----------( 223      ( 23 Mar 2021 07:58 )             40.0     03-23    136  |  100  |  17  ----------------------------<  86  3.8   |  26  |  0.82    Ca    9.7      23 Mar 2021 07:58  Phos  2.7     03-23  Mg     1.8     03-23                RADIOLOGY & ADDITIONAL TESTS:  < from: CT Angio Chest w/ IV Cont (03.23.21 @ 11:58) >  PROCEDURE:  CT Angiography of the Chest.  Sagittal and coronal reformats were performed as well as 3D (MIP) reconstructions.    FINDINGS:  PULMONARY VESSELS: No filling defects are noted within the main, right and left main, lobar and proximal segmental pulmonary arteries bilaterally. Evaluation of the mid to distal segmental and subsegmental pulmonary arteries bilaterally is limited due to breathing motion artifact.  LUNGS AND AIRWAYS: Patent central airways.  Extensive groundglass opacities/consolidation are noted throughout both lungs.  PLEURA: No pleural effusion.  MEDIASTINUM AND PRESTON: No lymphadenopathy.  VESSELS: Aortic calcification.  HEART: Heart size is enlarged. No pericardial effusion. Aortic valve replacement.  CHEST WALL AND LOWER NECK: Within normal limits.  VISUALIZED UPPER ABDOMEN: Small hiatal hernia.  BONES: Median sternotomy. Degenerative changes.    IMPRESSION:  No pulmonary embolus is noted within the main, right and left main, lobar and proximal segmental pulmonary arteries bilaterally. Evaluation of the mid to distal segmental and subsegmental pulmonary arteries bilaterally is limited.    Extensive groundglass opacities/consolidation noted within both lungs are consistent with patient's diagnosis of Covid 19.      < end of copied text >    Imaging Personally Reviewed:    Consultant(s) Notes Reviewed:      Care Discussed with Consultants/Other Providers:

## 2021-03-23 NOTE — PROGRESS NOTE ADULT - PROBLEM SELECTOR PLAN 1
Hypoxic on admission in setting of COVID infection. Suspect underlying lung pathology, such as undiagnosed sleep apnea based on collateral hx from pt and her family. Pt appears comfortable despite hypoxia. Noted significant increase in D-dimer yesterday , trended down today 1309-->315   - monitor oxygen saturations closely  - continue with supplemental O2 to maintain O2 sat's 88% and above, preferably 90 and above.   - recommend outpatient sleep study  - obtain CT angio chest to r/o acute PE, doppler LE negative for DVT  - c/w Lovenox 40 mg BID Hypoxic on admission in setting of COVID infection. Suspect underlying lung pathology, such as undiagnosed sleep apnea based on collateral hx from pt and her family. Pt appears comfortable despite hypoxia. Noted significant increase in D-dimer yesterday , trended down today 1309-->315   - monitor oxygen saturations closely  - continue with supplemental O2 to maintain O2 sat's 88% and above, preferably 90 and above.   - recommend outpatient sleep study  - CT  angio chest negative for  acute PE,  but shows Extensive groundglass opacities/consolidation noted within both lungs are consistent with patient's diagnosis of Covid 19.  doppler LE negative for DVT  - c/w Lovenox 40 mg BID  -incentive spirometer

## 2021-03-23 NOTE — CONSULT NOTE ADULT - ATTENDING COMMENTS
patient with hypoxemic respiratory failure 2/2 covid PNA, unable to wean off HFNC since DOA. Agree with above. Trial of diuresis. GOC discussion - pt DNR/.DNI. prognosis is guarded.

## 2021-03-23 NOTE — PROGRESS NOTE ADULT - PROBLEM SELECTOR PLAN 2
Hypoxic on admission. Completed 5-day course of Remdesivir on 3/17/21  - continue dexamethasone; currently on day 10 of 10 --> would consider extending course of decrease to prednisone  - monitor inflammatory markers [id number], oxygen sats  - VTE ppx: lovenox 40mg BID Hypoxic on admission. Completed 5-day course of Remdesivir on 3/17/21  and 10 day course of  dexamethasone; 3/21-> would consider extending course of decrease to prednisone  - monitor inflammatory markers [id number], oxygen sats  - VTE ppx: lovenox 40mg BID Hypoxic on admission. Completed 5-day course of Remdesivir on 3/17/21  and 10 day course of  dexamethasone; 3/21  - monitor inflammatory markers [id number], oxygen sats  - VTE ppx: lovenox 40mg BID Hypoxic on admission. Completed 5-day course of Remdesivir on 3/17/21  and 10 day course of  dexamethasone; 3/21, pt still ion NRB 15L o2 , desaturating when mask removed for incentive spirometry , pulmonary consult requested , will f/u recs    - monitor inflammatory markers [id number], oxygen sats  - VTE ppx: lovenox 40mg BID

## 2021-03-24 NOTE — PROGRESS NOTE ADULT - PROBLEM SELECTOR PLAN 1
Hypoxic on admission in setting of COVID infection. Suspect underlying lung pathology, such as undiagnosed sleep apnea based on collateral hx from pt and her family. RRT called for hypoxia.  Pt placed on BIPAP, MICU eval noted, d/w family GOC, risk and benefit of intubation discussed,  family to decide   Noted significant increase in D-dimer but now  , trended down today 1309-->315   - monitor oxygen saturations closely  - continue with supplemental O2 to maintain O2 sat's 88% and above, preferably 90 and above.   - recommend outpatient sleep study  - CT  angio chest negative for  acute PE,  but shows Extensive groundglass opacities/consolidation noted within both lungs are consistent with patient's diagnosis of Covid 19.  doppler LE negative for DVT  - c/w Lovenox 40 mg BID  -incentive spirometer  -pt was evaluated by Pulmonary yesterday, s/p one dose if IV lasix 40 mg , will continue to monitor

## 2021-03-24 NOTE — PROGRESS NOTE ADULT - PROBLEM SELECTOR PLAN 3
SCr. peaked at 1.5 (baseline 0.6-0.8). Now with recurrent episode with increase in BUN/Cr, likely due to inadequate oral intake and re-initiation of home regimen of ARB  - resolved s/p IVF   -  holding  lasix 40mg daily and HCTZ 12.5mg daily, pt s/p one dose of lasix 40 mg IV, will consider resuming lasix

## 2021-03-24 NOTE — CONSULT NOTE ADULT - ASSESSMENT
Recommendations for Palliative Discontinuation of BIPAP:    1. Discontinue all lab draws, monitors, and alarms in the room  2. Discontinue all antibiotics, IVF  3. Discontinue pressors prior to discontinuing vent support  4. Bolus with Morphine 2mg IV 10mins prior to procedure. Can repeat q5mins PRN for comfort. Liberalize as needed.  5. Bolus with 1mg Ativan IV 10mins prior to procedure. Can repeat q5mins PRN for comfort. Liberalize as needed.   6. Bolus with Glycopyrrolate 0.4mg IV x 1 10mins prior to procedure.   7. Make sure medications given and titrated to achieve palliative sedation prior to extubation  8. Discontinue ventilator support once patient is adequately sedated  9. Continue to monitor at bedside for any signs of distress .     If patient stabilizes, family ok with possible hospice transfer to facility    *****FULL CONSULT NOTE TO FOLLOW***** 76 year old female Azeri speaking, pmhx HTN,hyperlipidemia aortic stenosis s/p aortic valve replacement , MDD, lower leg edema, hx of DVT, obesity admitted for COVID and respiratory failure. Palliative Medicine was consulted for complex medical decision making related to goals of care discussions    -------------------------------------------------------------------------------------  END OF LIFE CARE RECOMMENDATIONS:  - Comfort care measures only  - Discontinue all lab tests  - Discontinue all monitors and alarms  - Transfer to private room if possible  - 3/24: Family wants to d/c BIPAP for comfort  - I was made aware the floors can only give comfort medications even around time of stopping BIPAP every 2 hours. This is not ideal, but orders were changed as below:    # ENCEPHALOPATHY/ AGITATION/ TERMINAL DELIRIUM  - Pls do not give sedatives to hypoactive delirium or non-distressing agitation  - Ativan 1mg IV q2h PRN  - Continue clear communication as you are with patient and family    # RESPIRATORY FAILURE/ DYSPNEA/ SHORTNESS OF BREATH  - Morphine 2mg IV q2h PRN (can stagger with ATivan for SOB)  - Can supplement O2 via NC for comfort or place a bedside fan to the face    # INCREASED OROPHARYNGEAL SECRETIONS/ CONGESTION OF UPPER AIRWAY/ TERMINAL SECRETIONS  - Consider: Glycopyrrolate 0.4mg IV/ SQ q4h PRN  - Can also consider a Scopolamine patch, although watch out for delirium    # PAIN  - Opioids as above    # NAUSEA  - Consider: Zofran 4mg IV q4h PRN  - Can also consider: Haldol 0.5mg or Ativan 0.5mg IV/IM/SQ/PO q4h PRN for nausea    # FEVERS  - Consider: Acetaminophen 650mg PO/ ME q6h PRN  - Can use cooling blankets as well    # CONSTIPATION  - Will defer constipation management for comfort    # FUNCTIONAL QUADRIPLEGIA  - Pls assist with ADL's  - Skin care as per protocol

## 2021-03-24 NOTE — PROGRESS NOTE ADULT - ASSESSMENT
76F, Korean-speaking, with hx of aortic stenosis s/p porcine valve replacement (10 years ago), HTN, HLD, CKD who presents with SOB and cough a/w acute hypoxic respiratory failure 2/2 COVID PNA. Course c/b SKY. SKY has resolved, but pt still with high O2 requirements

## 2021-03-24 NOTE — CONSULT NOTE ADULT - ATTENDING COMMENTS
Patient with acute hypoxemic respiratory failure due to covid 19, more hypoxemic now, requiring bipap.  Family at bedside - patient dnr/dni.   c/w current mgmt, symptom control for tachypnea  Patient does not require MICU level of care at this time.  Please re-consult as needed.

## 2021-03-24 NOTE — CONSULT NOTE ADULT - SUBJECTIVE AND OBJECTIVE BOX
CHIEF COMPLAINT:    HPI: HPI:  76 year old female [place] speaking, pmhx HTN,hyperlipidemia aortic stenosis s/p aortic valve replacement , MDD, lower leg edema, hx of DVT, obesity    presented due to progressive SOB,non productive cough , myalgia and nausea over 1 week duration. she tested positive for COVID-19 at outpatient urgent care 3/12 was sent to the ER due to hypoxia noted to be on room air 85% in the ER.  known sick contact her daughter , grandson, daughter.  was started on supplemental oxygen 4L/min saturation 95% , bp 121/63mmHg, hr 93 bpm, temp 98.4F  chest xray with bilateral infiltrates and cardiomegaly    she denies fever or chills, abdominal pain or dysuria , has chronic lower extremity edema   ekg sinus rythm with sinus arrythmia, LBB rate 76bpm, QTC 490ms.    The patient has continued to decompensate and is currently on BiPAP, breathing at a rate of 50 and has an O2 saturation of 81%. Spoke with the patient's family on the bedside. Stated given her current status, the patient should be intubated if the desire is to continue aggressive measures. However, also stated given the patient's age and the length of time she has been sick her likely clancy of recovery is very low. Informed the family if the patient were to be intubated she may recover, but more likely she would either die while on the ventilator or require be dependent for a prolonged period of time on the ventilator. The other option would be to focus on making her comfortable and manage her symptoms and allow her to pass comfortable.      Stated need to make a decision soon and we cannot let the patient persist in this state.       PAST MEDICAL & SURGICAL HISTORY:  Edema, lower extremity    MDD (major depressive disorder)    History of aortic valve repair    Aortic stenosis    Hyperlipidemia    HTN (hypertension)    History of aortic valve repair    History of appendectomy        FAMILY HISTORY:  FH: HTN (hypertension)        SOCIAL HISTORY:  Smoking: __ packs x ___ years  EtOH Use:  Marital Status:  Occupation:  Recent Travel:  Country of Birth:  Advance Directives:    Allergies    No Known Allergies    Intolerances        HOME MEDICATIONS:    REVIEW OF SYSTEMS:  Constitutional:   Eyes:  ENT:  CV:  Resp:  GI:  :  MSK:  Integumentary:  Neurological:  Psychiatric:  Endocrine:  Hematologic/Lymphatic:  Allergic/Immunologic:  [ ] All other systems negative  [x ] Unable to assess ROS because patient has difficulty speaking while on BiPAP    OBJECTIVE:  ICU Vital Signs Last 24 Hrs  T(C): 36.7 (24 Mar 2021 10:30), Max: 37.1 (23 Mar 2021 21:50)  T(F): 98 (24 Mar 2021 10:30), Max: 98.8 (23 Mar 2021 21:50)  HR: 88 (24 Mar 2021 10:30) (82 - 90)  BP: 144/72 (24 Mar 2021 10:30) (143/65 - 147/69)  BP(mean): --  ABP: --  ABP(mean): --  RR: 26 (24 Mar 2021 10:30) (19 - 26)  SpO2: 82% (24 Mar 2021 10:30) (82% - 93%)        CAPILLARY BLOOD GLUCOSE      POCT Blood Glucose.: 89 mg/dL (24 Mar 2021 10:10)      PHYSICAL EXAM:  GENERAL: NAD, well-groomed, well-developed  HEAD:  Atraumatic, Normocephalic  EYES: EOMI, PERRLA, conjunctiva and sclera clear  ENMT: BiPAP mask in place   NECK: Supple, No JVD, Normal thyroid  CHEST/LUNG: bilateral crackles   HEART: Regular rate and rhythm; No murmurs, rubs, or gallops  ABDOMEN: Soft, Nontender, Nondistended; Bowel sounds present  VASCULAR:  2+ Peripheral Pulses, No clubbing, cyanosis, or edema  LYMPH: No lymphadenopathy noted  SKIN: No rashes or lesions      HOSPITAL MEDICATIONS:  MEDICATIONS  (STANDING):  ALPRAZolam 0.25 milliGRAM(s) Oral three times a day  aspirin enteric coated 81 milliGRAM(s) Oral daily  citalopram 20 milliGRAM(s) Oral at bedtime  enoxaparin Injectable 40 milliGRAM(s) SubCutaneous two times a day  furosemide   Injectable 40 milliGRAM(s) IV Push once  metoprolol succinate ER 50 milliGRAM(s) Oral daily  polyethylene glycol 3350 17 Gram(s) Oral two times a day  QUEtiapine 100 milliGRAM(s) Oral at bedtime  senna 2 Tablet(s) Oral at bedtime  sodium chloride 0.65% Nasal 1 Spray(s) Both Nostrils every 8 hours  sodium chloride 0.9%. 1000 milliLiter(s) (100 mL/Hr) IV Continuous <Continuous>  valsartan 320 milliGRAM(s) Oral daily    MEDICATIONS  (PRN):  acetaminophen   Tablet .. 650 milliGRAM(s) Oral every 6 hours PRN Mild Pain (1 - 3)  guaiFENesin   Syrup  (Sugar-Free) 100 milliGRAM(s) Oral every 6 hours PRN Cough  oxyCODONE    IR 5 milliGRAM(s) Oral every 6 hours PRN Severe Pain (7 - 10)  oxyCODONE    IR 2.5 milliGRAM(s) Oral every 6 hours PRN Moderate Pain (4 - 6)      LABS:                        12.5   11.30 )-----------( 204      ( 24 Mar 2021 07:13 )             40.7     03-24    139  |  100  |  23  ----------------------------<  88  3.8   |  25  |  0.95    Ca    10.1      24 Mar 2021 07:13  Phos  3.8     03-24  Mg     2.1     03-24    TPro  6.5  /  Alb  2.9<L>  /  TBili  0.6  /  DBili  x   /  AST  21  /  ALT  11  /  AlkPhos  110  03-24              MICROBIOLOGY:     RADIOLOGY:  [ ] Reviewed and interpreted by me    EKG:

## 2021-03-24 NOTE — PROGRESS NOTE ADULT - PROBLEM SELECTOR PROBLEM 1
Acute respiratory failure with hypoxia

## 2021-03-24 NOTE — RAPID RESPONSE TEAM SUMMARY - NSSITUATIONBACKGROUNDRRT_GEN_ALL_CORE
RRT called for desaturation. On arrival, patient on BiPAP 12/6 100% with spO2 88-90%, RR 30. Primary team called family, GOC in progress, visit to be coordinated with family. At this time, patient still full code.

## 2021-03-24 NOTE — PROGRESS NOTE ADULT - PROBLEM SELECTOR PLAN 6
Chronic b/l LE edema, non-pitting  - holding home lasix.  pt s/p one dose of lasix 40 mg IV, will consider resuming lasix

## 2021-03-24 NOTE — CONSULT NOTE ADULT - SUBJECTIVE AND OBJECTIVE BOX
HPI:  76 year old female Pakistani speaking, pmhx HTN,hyperlipidemia aortic stenosis s/p aortic valve replacement , MDD, lower leg edema, hx of DVT, obesity admitted for COVID and respiratory failure. Palliative Medicine was consulted for complex medical decision making related to goals of care discussions    =======================================================  3/24/2021    - Chart reviewed. Patient seen and examined. Discussed w/ nurse and ACP.  - Spoke to daughters outside room. In summary:    1) DNR, DNI, COMFORT MEASURES  2) D/C BIPAP FOR COMFORT  3) They agree with use of comfort medications  4) Goal is not to prolong suffering  5) If patient remains stable, I broached possiblity of hospice at home or in facility    =======================================================  ----- SYMPTOM ASSESSMENT:   [ ]Unable to obtain due to poor mentation: information obtained from team/ contact    PAIN ASSESSMENT: DENIED  Site-   Onset-   Character-   Radiation-   Associated symptoms-   Timing and duration-   Exacerbating factors  Severity-     Effect on QOL-   PAIN AD Score: 0    Dyspnea:  Yes [ ] No [ ] - [ ]Mild [ ]Moderate [ ]Severe  Anxiety:    Yes [ ] No [ ] - [ ]Mild [ ]Moderate [ ]Severe  Fatigue:    Yes [ ] No [ ] - [ ]Mild [ ]Moderate [ ]Severe  Nausea:    Yes [ ] No [ ] - [ ]Mild [ ]Moderate [ ]Severe                         Loss of appetite: Yes [ ] No [ ] - [ ]Mild [ ]Moderate [ ]Severe             Constipation:  Yes [ ] No [ ] - [ ]Mild [ ]Moderate [ ]Severe  Grief: Yes [ ] No [ ]     [X ]All other review of systems negative, including: weakness, cough, colds, blurry vision, headaches, dysuria, pruritus, palpitations, muscle cramps, easy bruising, epistaxis, rashes      =======================================================  ----- PERTINENT PMH/ SXH/ FHX  Edema, lower extremity    MDD (major depressive disorder)    History of aortic valve repair    Aortic stenosis    Hyperlipidemia    HTN (hypertension)    No pertinent past medical history      History of aortic valve repair    History of appendectomy    No significant past surgical history      FAMILY HISTORY:  FH: HTN (hypertension)        ----- SOCIAL HISTORY:   [ ] Unable to elicit  Significant other/partner:    Children:   Spiritism/Spirituality:  Substance hx: Yes[ ]  No [ ]   Tobacco hx:  Yes [ ] No [ ]   Alcohol hx: Yes [ ] No [ ]   Home Opioid hx:  [ ] I-Stop Reference No:  Living Situation: [X ]Home  [ ]Long term care  [ ]Rehab [ ]Other    ----- ADVANCE DIRECTIVES:    DNR  Yes    MOLST  [ ]  Living Will  [ ]   DECISION MAKER(s):  [ ] Health Care Proxy(s)  [ ] Surrogate(s)  [ ] Guardian           Name(s): Phone Number(s):  DAUGHTERS    ----- BASELINE (I)ADL(s) (prior to admission):  [place]: [ ]Total  [ ] Moderate [ ]Dependent  Palliative Performance Status Version 2:        =======================================================  -----MEDICATIONS AND ALLERGIES:  Allergies    No Known Allergies    Intolerances    MEDICATIONS  (STANDING):  ALPRAZolam 0.25 milliGRAM(s) Oral three times a day  aspirin enteric coated 81 milliGRAM(s) Oral daily  citalopram 20 milliGRAM(s) Oral at bedtime  enoxaparin Injectable 40 milliGRAM(s) SubCutaneous two times a day  furosemide   Injectable 40 milliGRAM(s) IV Push once  metoprolol succinate ER 50 milliGRAM(s) Oral daily  polyethylene glycol 3350 17 Gram(s) Oral two times a day  QUEtiapine 100 milliGRAM(s) Oral at bedtime  senna 2 Tablet(s) Oral at bedtime  sodium chloride 0.65% Nasal 1 Spray(s) Both Nostrils every 8 hours  sodium chloride 0.9%. 1000 milliLiter(s) (100 mL/Hr) IV Continuous <Continuous>  valsartan 320 milliGRAM(s) Oral daily    MEDICATIONS  (PRN):  acetaminophen   Tablet .. 650 milliGRAM(s) Oral every 6 hours PRN Mild Pain (1 - 3)  guaiFENesin   Syrup  (Sugar-Free) 100 milliGRAM(s) Oral every 6 hours PRN Cough  LORazepam   Injectable 2 milliGRAM(s) IV Push every 2 hours PRN SOB/ AGITATION  morphine  - Injectable 2 milliGRAM(s) IV Push every 2 hours PRN SOB/ PAIN  oxyCODONE    IR 5 milliGRAM(s) Oral every 6 hours PRN Severe Pain (7 - 10)  oxyCODONE    IR 2.5 milliGRAM(s) Oral every 6 hours PRN Moderate Pain (4 - 6)      =======================================================  ----- PHYSICAL EXAM:  Vital Signs Last 24 Hrs  T(C): 36.7 (24 Mar 2021 10:30), Max: 37.1 (23 Mar 2021 21:50)  T(F): 98 (24 Mar 2021 10:30), Max: 98.8 (23 Mar 2021 21:50)  HR: 93 (24 Mar 2021 16:01) (82 - 93)  BP: 144/72 (24 Mar 2021 10:30) (143/65 - 145/73)  BP(mean): --  RR: 42 (24 Mar 2021 15:14) (19 - 42)  SpO2: 84% (24 Mar 2021 16:01) (80% - 93%)    GEN: Awake, alert, IN DISTRESS  HEAD: Normocephalic and atraumatic, BIPAP  EYES: Anicteric sclerae, EOM's grossly intact  NECK: No JVD, no thyromegaly  PULM: Comfortable work of breathing, clear BS  CV: Pulses 2+ symmetric bilaterally, regular rate and rhythm  ABD: Not distended, soft, non-tender,   EXT: No edema, No deformities  PSYCH: UNABLE TO ASSESS  NEURO: No facial asymmetry, no tremors, no observed movement disorders  SKIN: No rashes or lesions on exposed skin, No jaundice    =======================================================  ----- LABS:                        12.5   11.30 )-----------( 204      ( 24 Mar 2021 07:13 )             40.7   03-24    139  |  100  |  23  ----------------------------<  88  3.8   |  25  |  0.95    Ca    10.1      24 Mar 2021 07:13  Phos  3.8     03-24  Mg     2.1     03-24    TPro  6.5  /  Alb  2.9<L>  /  TBili  0.6  /  DBili  x   /  AST  21  /  ALT  11  /  AlkPhos  110  03-24        -----RADIOLOGY & ADDITIONAL STUDIES:    PROTEIN CALORIE MALNUTRITION PRESENT: [ ] Yes [ ] No  [ ] PPSV2 < or = to 30% [ ] significant weight loss  [ ] poor nutritional intake [ ] catabolic state [ ] anasarca     Albumin, Serum: 2.9 g/dL (03-24-21 @ 07:13)      REFERRALS:   [ ]Chaplaincy  [ ] Hospice  [ ]Child Life  [ ]Social Work  [ ]Case management [ ]Holistic Therapy   Goals of Care Discussion Document:     ************************************************************************  PALLIATIVE MEDICINE COORDINATION OF CARE DOCUMENTATION  [x] Inpatient Consult  [ ] Other:  ************************************************************************  ------------------------------------------------------------------------  COORDINATION OF CARE:  --- Palliative Care consulted for: [facility]  --- Patient assessed: 3/24  --- Patient previously seen by Palliative Care service: NO  ADVANCE CARE PLANNING  --- Code status: DNR, DNI  --- MOLST reviewed in chart: YES  --- HCP/ Surrogate: DAUGHTERS  --- GOC document found in Alpha: NONE  --- HCP/ Living will/ Other advanced directives in Alpha: NONE  ------------------------------------------------------------------------  CARE PROVIDER DOCUMENTATION:  --- Discussed w/ ACP 3/24  --- PULM: S/p remdesivir and dex  --- Medicine notes reviewed  PLAN OF CARE  --- Known admissions in past year: 1  --- Current admit date: 3/12  --- LOS: 12  --- LACE score: 8  --- Current dispo plan: TO BE DETERMINED  ------------------------------------------------------------------------  --- Chart reviewed: 30 Minutes [including time used to gather, review and transfer data to this note]  --- Start: 1:30  --- End: 2:00  Prolonged services rendered, as part of this patient's care provided by Palliative Medicine, include: i.chart review for provider and ancillary service documentation, ii.pertinent diagnostics including laboratory and imaging studies,iii. medication review including PRN use, iv. admission history including previous palliative care encounters and GOC notes, v.advance care planning documents including HCP and MOLST forms in Alpha. Part of Palliative Medicine extended evaluation and management also involves coordination of care with our IDT, the primary and consulting lexie, and unit CM/SW and Hospice if eligible. Recommendations based on the information gathered and discussed are outline in the AP of our notes.    ************************************************************************

## 2021-03-24 NOTE — SWALLOW BEDSIDE ASSESSMENT ADULT - COMMENTS
Progress note 3/24/2021: 76F, Turkmen-speaking, with hx of aortic stenosis s/p porcine valve replacement (10 years ago), HTN, HLD, CKD who presents with SOB and cough a/w acute hypoxic respiratory failure 2/2 COVID PNA. Course c/b SKY. SKY has resolved, but pt still with high O2 requirements.     Per chart review, rapid response called 3/24/2021 for oxygen desaturation.     CT Chest 3/23/2021: extensive groundglass opacities/consolidation noted with both lungs are consistent with patient's diagnosis of COVID-19.     Consult received and chart reviewed. Patient received laying in bed, receiving supplemental oxygen via non-rebreather. Patient not appropriate for PO trials at this time. Reconsult when patient is appropriate. Progress note 3/24/2021: 76F, Irish-speaking, with hx of aortic stenosis s/p porcine valve replacement (10 years ago), HTN, HLD, CKD who presents with SOB and cough a/w acute hypoxic respiratory failure 2/2 COVID PNA. Course c/b SKY. SKY has resolved, but pt still with high O2 requirements.     Per chart review, rapid response called 3/24/2021 for oxygen desaturation.     CT Chest 3/23/2021: extensive groundglass opacities/consolidation noted with both lungs are consistent with patient's diagnosis of COVID-19.     Consult received and chart reviewed. Patient received laying in bed, receiving supplemental oxygen via non-rebreather. Patient not appropriate for PO trials at this time. Reconsult when patient is appropriate. PA notified and aware.

## 2021-03-24 NOTE — PROGRESS NOTE ADULT - SUBJECTIVE AND OBJECTIVE BOX
Patient is a 76y old  Female who presents with a chief complaint of dyspnea (24 Mar 2021 13:32)      SUBJECTIVE / OVERNIGHT EVENTS: patient seen and examined by bedside, pt on BIPAP , RRT called this morning for hypoxia on HNF , Family at bedside, pt alert , answering questions appropriately, reports headache  denies  dizziness, SOB, , Palpitations , N/V/D, abdominal pain         MEDICATIONS  (STANDING):  ALPRAZolam 0.25 milliGRAM(s) Oral three times a day  aspirin enteric coated 81 milliGRAM(s) Oral daily  citalopram 20 milliGRAM(s) Oral at bedtime  enoxaparin Injectable 40 milliGRAM(s) SubCutaneous two times a day  furosemide   Injectable 40 milliGRAM(s) IV Push once  glycopyrrolate Injectable 0.2 milliGRAM(s) IV Push once  metoprolol succinate ER 50 milliGRAM(s) Oral daily  polyethylene glycol 3350 17 Gram(s) Oral two times a day  QUEtiapine 100 milliGRAM(s) Oral at bedtime  senna 2 Tablet(s) Oral at bedtime  sodium chloride 0.65% Nasal 1 Spray(s) Both Nostrils every 8 hours  sodium chloride 0.9%. 1000 milliLiter(s) (100 mL/Hr) IV Continuous <Continuous>  valsartan 320 milliGRAM(s) Oral daily    MEDICATIONS  (PRN):  acetaminophen   Tablet .. 650 milliGRAM(s) Oral every 6 hours PRN Mild Pain (1 - 3)  guaiFENesin   Syrup  (Sugar-Free) 100 milliGRAM(s) Oral every 6 hours PRN Cough  LORazepam   Injectable 1 milliGRAM(s) IV Push every 10 minutes PRN shortness of breath, labored breathing, RR >20  morphine  - Injectable 2 milliGRAM(s) IV Push every 10 minutes PRN shortness of breath, labored breathing, or RR >20  oxyCODONE    IR 5 milliGRAM(s) Oral every 6 hours PRN Severe Pain (7 - 10)  oxyCODONE    IR 2.5 milliGRAM(s) Oral every 6 hours PRN Moderate Pain (4 - 6)      Vital Signs Last 24 Hrs  T(C): 36.7 (24 Mar 2021 10:30), Max: 37.1 (23 Mar 2021 21:50)  T(F): 98 (24 Mar 2021 10:30), Max: 98.8 (23 Mar 2021 21:50)  HR: 88 (24 Mar 2021 10:30) (82 - 90)  BP: 144/72 (24 Mar 2021 10:30) (143/65 - 147/69)  BP(mean): --  RR: 26 (24 Mar 2021 10:30) (19 - 26)  SpO2: 82% (24 Mar 2021 10:30) (82% - 93%)  CAPILLARY BLOOD GLUCOSE      POCT Blood Glucose.: 96 mg/dL (24 Mar 2021 13:18)  POCT Blood Glucose.: 89 mg/dL (24 Mar 2021 10:10)  POCT Blood Glucose.: 79 mg/dL (24 Mar 2021 08:48)  POCT Blood Glucose.: 283 mg/dL (23 Mar 2021 22:29)    I&O's Summary      PHYSICAL EXAM:  GENERAL: NAD  morbidly obese, on BIPAP   HEAD:  Atraumatic, Normocephalic  EYES: EOMI, PERRLA, conjunctiva and sclera clear  CHEST/LUNG: bibasilar crackles  no wheeze  HEART: Regular rate and rhythm; No murmurs, rubs, or gallops  ABDOMEN: Soft, Nontender, Nondistended; Bowel sounds present  EXTREMITIES:  2+ Peripheral Pulses, No clubbing, cyanosis,  edema  PSYCH: Alert, aware of being in [facility]    NEUROLOGY: non-focal    LABS:                        12.5   11.30 )-----------( 204      ( 24 Mar 2021 07:13 )             40.7     03-24    139  |  100  |  23  ----------------------------<  88  3.8   |  25  |  0.95    Ca    10.1      24 Mar 2021 07:13  Phos  3.8     03-24  Mg     2.1     03-24    TPro  6.5  /  Alb  2.9<L>  /  TBili  0.6  /  DBili  x   /  AST  21  /  ALT  11  /  AlkPhos  110  03-24              RADIOLOGY & ADDITIONAL TESTS:    Imaging Personally Reviewed:    Consultant(s) Notes Reviewed:  MICU, Pulmonary     Care Discussed with Consultants/Other Providers:

## 2021-03-24 NOTE — CONSULT NOTE ADULT - ASSESSMENT
77 yo F PMH HTN, HLD, AS s/p AVR, MDD, hx of DVT, and obesity admitted for hypoxemic respiratory failure 2/2 covid PNA. MICU consulted for hypoxic respiratory failure     Hypoxic respiratory failure   - 2/2 COVID-19  - patient currently failing on BiPAP   - Hypoxic to 80s  - The patient's family at the bedside, discussed in detail the next steps and the patient's prognosis  - Given the patient's age and length of illness, her overall prognosis is poor  - Look to GOC section for further details   - pending final decision by the family for disposition     Yunior Kwon MD   Pulmonary/Critical Care Fellow PGY-5   [facility] Pager #: [phone]  [facility] Pager #: [phone]

## 2021-03-24 NOTE — PROGRESS NOTE ADULT - PROBLEM SELECTOR PLAN 2
Hypoxic on admission. Completed 5-day course of Remdesivir on 3/17/21  and 10 day course of  dexamethasone; 3/21, pt still ion NRB 15L o2 , desaturating when mask removed for incentive spirometry , pulmonary consult  appreciated   - monitor inflammatory markers [id number], oxygen sats  - VTE ppx: lovenox 40mg BID  -plan as above

## 2021-03-24 NOTE — CHART NOTE - NSCHARTNOTEFT_GEN_A_CORE
BIPAP palliatively removed per palliative recommendations.    Pt started on morphine ggt for tachypnea.     Dr. Mccray attending aware  Family notified, will continue to provide updates.   DNR/DNI    Ann Rodriguez PA-C  Department of Medicine  Pager [phone]

## 2021-03-24 NOTE — CHART NOTE - NSCHARTNOTEFT_GEN_A_CORE
Pt failing BIPAP satting 83%, on 100% FIO2.  Family visit and goc discussion. Family wishes to make pt DNR/DNI. MOLST in chart.   Palliative consulted.   Will continue to monitor and follow up palliative recommendations.   Discussed with Dr. Mccray and RN Geno Rodriguez PA-C  Department of Medicine  Pager [phone]

## 2021-03-24 NOTE — CHART NOTE - NSCHARTNOTEFT_GEN_A_CORE
RRT called by RN for desatting to 82% with tachypnea and belly breathing.   Pt placed on BIPAP 12/6 at 100% with improvement in O2 sat.   MICU consult called  Family visit arranged.  Pt currently FULL CODE, will continue to address GOC with family.   Will continue to monitor patient and escalate care as appropriate.    Ann Rodriguez PA-C  Department of Medicine  Pager [phone] Medicine Subsequent Hospital Care Note- ACP  CC: RRT called by RN for desatting to 82% with tachypnea and belly breathing.   HPI/Subjective: Desaturation to 82% with shortness of breath. NO inciting events.     ROS:  + SOB, COUGH, MA  Denies fever, chills, diaphoresis , malaise, night sweat, generalized weakness, hemoptysis, CP,  PND, palpitations, diaphoresis, lightheadedness, dizziness, syncope, edema. nausea, vomiting, diarrhea, constipation, abdominal pain, melena, hematochezia, dysphagia, dysuria, frequency, urgency, hematuria, nocturia.    -------------------------------------------------------------------------------------------------------------------------------------------------  Vital Signs:  Vital Signs Last 24 Hrs  T(C): 35 (03-25-21 @ 01:33), Max: 36.9 (03-24-21 @ 18:09)  T(F): 95 (03-25-21 @ 01:33), Max: 98.5 (03-24-21 @ 18:09)  HR: 66 (03-25-21 @ 01:33) (66 - 104)  BP: 54/38 (03-25-21 @ 01:33) (54/38 - 106/48)  RR: 18 (03-25-21 @ 01:33) (18 - 50)  SpO2: 68% (03-25-21 @ 01:33) (68% - 72%) on (O2)      General:  PT TACHYPNEIC AND BELLY BREATHING  Neurology: Awake, nonfocal, HIGHTOWER x 4  Eyes: Scleras clear, PERRLA/ EOMI, Gross vision intact  ENT:Gross hearing intact, grossly patent pharynx, no stridor  Neck: Neck supple, trachea midline, No JVD,   Respiratory: TACHYPNEIC  CV: RRR, S1S2, no murmurs, rubs or gallops  Abdominal: Soft, NT, ND +BS,   Extremities: No edema, + peripheral pulses  Skin: No Rashes, Hematoma, Ecchymosis  Lymphatic: No Neck, axilla, groin LAD  Psych: Oriented x 3, normal affect                          12.5   11.30 )-----------( 204      ( 24 Mar 2021 07:13 )             40.7     03-24    139  |  100  |  23  ----------------------------<  88  3.8   |  25  |  0.95    Ca    10.1      24 Mar 2021 07:13  Phos  3.8     03-24  Mg     2.1     03-24    TPro  6.5  /  Alb  2.9<L>  /  TBili  0.6  /  DBili  x   /  AST  21  /  ALT  11  /  AlkPhos  110  03-24      MEDICATIONS  (STANDING):  ALPRAZolam 0.25 milliGRAM(s) Oral three times a day  aspirin enteric coated 81 milliGRAM(s) Oral daily  citalopram 20 milliGRAM(s) Oral at bedtime  enoxaparin Injectable 40 milliGRAM(s) SubCutaneous two times a day  furosemide   Injectable 40 milliGRAM(s) IV Push once  metoprolol succinate ER 50 milliGRAM(s) Oral daily  morphine  Infusion 4 mG/Hr (4 mL/Hr) IV Continuous <Continuous>  polyethylene glycol 3350 17 Gram(s) Oral two times a day  QUEtiapine 100 milliGRAM(s) Oral at bedtime  senna 2 Tablet(s) Oral at bedtime  sodium chloride 0.65% Nasal 1 Spray(s) Both Nostrils every 8 hours  sodium chloride 0.9%. 1000 milliLiter(s) (100 mL/Hr) IV Continuous <Continuous>  valsartan 320 milliGRAM(s) Oral daily    MEDICATIONS  (PRN):  acetaminophen   Tablet .. 650 milliGRAM(s) Oral every 6 hours PRN Mild Pain (1 - 3)  guaiFENesin   Syrup  (Sugar-Free) 100 milliGRAM(s) Oral every 6 hours PRN Cough  LORazepam   Injectable 2 milliGRAM(s) IV Push every 2 hours PRN SOB/ AGITATION  oxyCODONE    IR 5 milliGRAM(s) Oral every 6 hours PRN Severe Pain (7 - 10)  oxyCODONE    IR 2.5 milliGRAM(s) Oral every 6 hours PRN Moderate Pain (4 - 6)    I&O's Summary    I reviewed the above lab results, tests, telemetry, and  EKG interpretation. .    Assessment  76y Female  w/ PAST MEDICAL & SURGICAL HISTORY:  Edema, lower extremity  MDD (major depressive disorder)  History of aortic valve repair  Aortic stenosis  Hyperlipidemia  HTN (hypertension)  History of aortic valve repair  History of appendectomy    76y old  Female who presents with a chief complaint of dyspnea (25 Mar 2021 03:12) .now with worsening hypoxia.   PLAN    1. Pt placed on BIPAP 12/6 at 100% with improvement in O2 sat.   2. MICU consult called  3. Family visit arranged.  4. Pt currently FULL CODE, will continue to address GOC with family.   5. Will continue to monitor patient and escalate care as appropriate.      Discussed with Dr. Mccray.  Clinical findings, labs, tests, telemetry, and ekg reviewed with attending. Will monitor patient closely.     Ann Rodriguez PA-C  Department of Medicine  Pager [phone]

## 2021-03-24 NOTE — PROGRESS NOTE ADULT - PROBLEM SELECTOR PLAN 7
BP moderately controlled  - c/w metoprolol,   - c/w Valsartan 320mg daily.   - Holding  Lasix and HCTZ, plan as above   - monitor BP

## 2021-03-24 NOTE — CONSULT NOTE ADULT - CONVERSATION DETAILS
Spoke with the patient's family on the bedside. Stated given her current status, the patient should be intubated if the desire is to continue aggressive measures. However, also stated given the patient's age and the length of time she has been sick her likely clancy of recovery is very low. Informed the family if the patient were to be intubated she may recover, but more likely she would either die while on the ventilator or require be dependent for a prolonged period of time on the ventilator. The other option would be to focus on making her comfortable and manage her symptoms and allow her to pass comfortable. Patient's family needs time to make a decision. Once again discussed the urgency of the decision.

## 2021-03-25 NOTE — DISCHARGE NOTE FOR THE EXPIRED PATIENT - HOSPITAL COURSE
77 yo Egyptian speaking F w/ PMHx HTN, HLD, CKD, proteinuria  hx of renal biopsy, aortic stenosis s/p AVR (porcine) 10 years, MDD, lower leg edema, obesity, presented due to progressive SOB,non productive cough, myalgia and nausea x1 week duration. she tested positive for COVID-19 at outpatient urgent care 3/12 was sent to the ER due to hypoxia noted to be on room air 85% in the ER.      Problem/Plan - 1:  ·  Problem: Acute respiratory failure with hypoxia.  Plan: Hypoxic on admission in setting of COVID infection. Suspect underlying lung pathology, such as undiagnosed sleep apnea based on collateral hx from pt and her family. Pt appears comfortable despite hypoxia.   - monitor oxygen saturations closely  - continue with supplemental O2 to maintain O2 sat's 88% and above, preferably 90 and above.   - recommend outpatient sleep study.      Problem/Plan - 2:  ·  Problem: COVID-19 virus infection.  Plan: Hypoxic on admission. Completed 5-day course of Remdesivir on 3/17/21  - continue dexamethasone; currently on day 9 of 10  - monitor inflammatory markers [id number], oxygen sats  - VTE ppx: lovenox 40mg BID.      Problem/Plan - 3:  ·  Problem: SKY (acute kidney injury).  Plan: SCr. peaked at 1.5 (baseline 0.6-0.8). Now with recurrent episode with increase in BUN/Cr, likely due to inadequate oral intake and re-initiation of home regimen of ARB  - start normal saline @ 100cc/hr for 1 day; encourage oral intake as tolerated  - continue to hold lasix 40mg daily and HCTZ 12.5mg daily.      Problem/Plan - 4:  ·  Problem: Constipation, unspecified constipation type.  Plan: Relieved s/p SMOG enema on 3/17  - daily bowel regimen with senna/Miralax  - stool counts  - would avoid narcotics as much as tolerated.      Problem/Plan - 5:  ·  Problem: Aortic stenosis.  Plan: Hx of aortic stenosis with porcine valve repair (10 years ago)  - euvolemic on exam  - continue to monitor.      Problem/Plan - 6:  Problem: Edema, lower extremity. Plan: Chronic b/l LE edema, non-pitting  - holding home lasix. Pt euvolemic on exam.     Problem/Plan - 7:  ·  Problem: Essential hypertension.  Plan: BP better controlled this AM  - c/w metoprolol,   - c/w Valsartan 320mg daily. If Cr continues to increase, will hold  - Hold Lasix and HCTZ  - monitor BP.      Problem/Plan - 8:  ·  Problem: Major depressive disorder in full remission, unspecified whether recurrent.  Plan: Pt with associated anxiety  - c/w citalopram, quetiapine  - standing alprazolam 0.25mg TID.    75 yo [place] speaking F w/ PMHx HTN, HLD, CKD, proteinuria  hx of renal biopsy, aortic stenosis s/p AVR (porcine) 10 years, MDD, lower leg edema, obesity, presented due to progressive SOB,non productive cough, myalgia and nausea x1 week duration. she tested positive for COVID-19 at outpatient urgent care 3/12 was sent to the ER due to hypoxia noted to be on room air 85% in the ER.      Problem/Plan - 1:  ·  Problem: Acute respiratory failure with hypoxia.  Plan: Hypoxic on admission in setting of COVID infection. Suspect underlying lung pathology, such as undiagnosed sleep apnea based on collateral hx from pt and her family. Pt appears comfortable despite hypoxia.   - monitor oxygen saturations closely  - continue with supplemental O2 to maintain O2 sat's 88% and above, preferably 90 and above.   - recommend outpatient sleep study.      Problem/Plan - 2:  ·  Problem: COVID-19 virus infection.  Plan: Hypoxic on admission. Completed 5-day course of Remdesivir on 3/17/21  - continue dexamethasone; currently on day 9 of 10  - monitor inflammatory markers [id number], oxygen sats  - VTE ppx: lovenox 40mg BID.      Problem/Plan - 3:  ·  Problem: SKY (acute kidney injury).  Plan: SCr. peaked at 1.5 (baseline 0.6-0.8). Now with recurrent episode with increase in BUN/Cr, likely due to inadequate oral intake and re-initiation of home regimen of ARB  - start normal saline @ 100cc/hr for 1 day; encourage oral intake as tolerated  - continue to hold lasix 40mg daily and HCTZ 12.5mg daily.      Problem/Plan - 4:  ·  Problem: Constipation, unspecified constipation type.  Plan: Relieved s/p SMOG enema on 3/17  - daily bowel regimen with senna/Miralax  - stool counts  - would avoid narcotics as much as tolerated.      Problem/Plan - 5:  ·  Problem: Aortic stenosis.  Plan: Hx of aortic stenosis with porcine valve repair (10 years ago)  - euvolemic on exam  - continue to monitor.      Problem/Plan - 6:  Problem: Edema, lower extremity. Plan: Chronic b/l LE edema, non-pitting  - holding home lasix. Pt euvolemic on exam.     Problem/Plan - 7:  ·  Problem: Essential hypertension.  Plan: BP better controlled this AM  - c/w metoprolol,   - c/w Valsartan 320mg daily. If Cr continues to increase, will hold  - Hold Lasix and HCTZ  - monitor BP.      Problem/Plan - 8:  ·  Problem: Major depressive disorder in full remission, unspecified whether recurrent.  Plan: Pt with associated anxiety  - c/w citalopram, quetiapine  - standing alprazolam 0.25mg TID.     3/25 - Notified by RN that patient without pulse or respirations. Patient DNR/DNI. Patient unresponsive to verbal/noxious stimuli.  Pupils fixed and dilated. No spontaneous breathing. No palpable carotid/radial pulse. No heart sounds. No breath sounds.   Time of Death: 03:12am.   Hospitalist notified.  Family Jo Muhammad (granddaughter) called at [phone] and notified.    75 yo [place] speaking F w/ PMHx HTN, HLD, CKD, proteinuria  hx of renal biopsy, aortic stenosis s/p AVR (porcine) 10 years, MDD, lower leg edema, obesity, presented due to progressive SOB,non productive cough, myalgia and nausea x1 week duration. she tested positive for COVID-19 at outpatient urgent care 3/12 was sent to the ER due to hypoxia noted to be on room air 85% in the ER.      Problem/Plan - 1:  ·  Problem: Acute respiratory failure with hypoxia.  Plan: Hypoxic on admission in setting of COVID infection. Suspect underlying lung pathology, such as undiagnosed sleep apnea based on collateral hx from pt and her family. Pt appears comfortable despite hypoxia.   - monitor oxygen saturations closely  - continue with supplemental O2 to maintain O2 sat's 88% and above, preferably 90 and above.   - recommend outpatient sleep study.      Problem/Plan - 2:  ·  Problem: COVID-19 virus infection.  Plan: Hypoxic on admission. Completed 5-day course of Remdesivir on 3/17/21  - continue dexamethasone; currently on day 9 of 10  - monitor inflammatory markers [id number], oxygen sats  - VTE ppx: lovenox 40mg BID.      Problem/Plan - 3:  ·  Problem: SKY (acute kidney injury).  Plan: SCr. peaked at 1.5 (baseline 0.6-0.8). Now with recurrent episode with increase in BUN/Cr, likely due to inadequate oral intake and re-initiation of home regimen of ARB  - start normal saline @ 100cc/hr for 1 day; encourage oral intake as tolerated  - continue to hold lasix 40mg daily and HCTZ 12.5mg daily.      Problem/Plan - 4:  ·  Problem: Constipation, unspecified constipation type.  Plan: Relieved s/p SMOG enema on 3/17  - daily bowel regimen with senna/Miralax  - stool counts  - would avoid narcotics as much as tolerated.      Problem/Plan - 5:  ·  Problem: Aortic stenosis.  Plan: Hx of aortic stenosis with porcine valve repair (10 years ago)  - euvolemic on exam  - continue to monitor.      Problem/Plan - 6:  Problem: Edema, lower extremity. Plan: Chronic b/l LE edema, non-pitting  - holding home lasix. Pt euvolemic on exam.     Problem/Plan - 7:  ·  Problem: Essential hypertension.  Plan: BP better controlled this AM  - c/w metoprolol,   - c/w Valsartan 320mg daily. If Cr continues to increase, will hold  - Hold Lasix and HCTZ  - monitor BP.      Problem/Plan - 8:  ·  Problem: Major depressive disorder in full remission, unspecified whether recurrent.  Plan: Pt with associated anxiety  - c/w citalopram, quetiapine  - standing alprazolam 0.25mg TID.     3/25 - Notified by RN that patient without pulse or respirations. Patient DNR/DNI. Patient unresponsive to verbal/noxious stimuli.  Pupils fixed and dilated. No spontaneous breathing. No palpable carotid/radial pulse. No heart sounds. No breath sounds.   Time of Death: 03:12am.   Hospitalist notified.  Family Jo Muhammad (granddaughter) called at [phone] and notified.     Attending Addendum: called granddaughter for condolence and to provide emotional support

## 2022-07-28 NOTE — ED ADULT NURSE NOTE - NS ED NURSE DISCH DISPOSITION
Pain level assessment complete. Patient educated on pain scale and control interventions  PRN pain medication given per patient request  Patient instructed to call out with new onset of pain or unrelieved pain    Siderails up x 2  Hourly rounding  Call light in reach  Instructed to call for assist before attempting out of bed.   Remains free from falls and accidental injury at this time   Floor free from obstacles  Bed is locked and in lowest position  Adequate lighting provided  Bed alarm on, [place] star and Stay with Me signs posted Admitted

## 2023-07-10 NOTE — PROGRESS NOTE ADULT - REASON FOR ADMISSION
Acute hypoxic respiratory failure 2/2 COVID
Acute hypoxic respiratory failure 2/2 COVID
dyspnea
Acute hypoxic respiratory failure 2/2 COVID
dyspnea
Acute hypoxic respiratory failure 2/2 COVID
Acute hypoxic respiratory failure 2/2 COVID-19
dyspnea
dyspnea
Performed
Resulted

## 2024-05-28 NOTE — DIETITIAN INITIAL EVALUATION ADULT. - DOB: +DATEOFBIRTH
05/27/24 0915   Vital Signs   Temp 98.1 °F (36.7 °C)   Temp Source Oral   Pulse 72   Heart Rate Source Monitor   Respirations 18   /63   MAP (Calculated) 81   BP Location Left upper arm   BP Method Automatic   Patient Position Right side   Pain Assessment   Pain Assessment 0-10   Opioid-Induced Sedation   POSS Score 1   RASS   [place] Agitation Sedation Scale (RASS) 0   Oxygen Therapy   SpO2 95 %   O2 Device None (Room air)     Alert and oriented, anxious, skin w/d, respirs e/e unlabored, meds given, VSS, c/o headache, nurse assessment completed, call light and overbed table within easy reach, bed alarm, no other voiced concerns or needs at this time. See flow sheet and MAR. Kathy Moulton RN ([place])    
   05/28/24 0930   Vital Signs   Temp 97.6 °F (36.4 °C)   Temp Source Axillary   Pulse 64   Heart Rate Source Monitor   Respirations 18   /69   MAP (Calculated) 85   BP Location Right upper arm   BP Method Automatic   Patient Position Sitting   Pain Assessment   Pain Assessment None - Denies Pain   Oxygen Therapy   SpO2 95 %   O2 Device None (Room air)     AM assessment completed, see flow sheet. Pt is alert and oriented, intermittent confusion, disoriented to time. Vital signs are WNL. Respirations are even & easy. No complaints voiced. Pt denies needs at this time. SR up x 2, and bed in low position. Call light is within reach.   
Admission assessment complete. See doc flow. A/O x4 Can be forgetful. Lives alone and reports receiving home care. Presenting to ER c/o SOB and Chest pain. Hx of COPD, Emphysema and asthma. Pacemaker to Left chest. Telemetry monitor in place. Patient is very anxious. Patient ambulates with a stand-by assist with a walker or cane. Patient brought in a bag of home meds, will verify. Bath given with CHG wipes and clean gown and gripper socks applied. NPO w/ ice chips. No other needs noted at this time. Bed alarm in place. Call light and bedside table within easy reach.    05/27/24 0430   Vital Signs   Temp 97.6 °F (36.4 °C)   Temp Source Oral   Pulse 81   Heart Rate Source Monitor   Respirations 18   /68   MAP (Calculated) 85   BP Location Left upper arm   BP Method Automatic   Patient Position Semi fowlers   Oxygen Therapy   SpO2 98 %   O2 Device None (Room air)   Height and Weight   Height 1.651 m (5' 5\")   Weight - Scale 58.6 kg (129 lb 1.6 oz)   Weight Method Actual;Bed scale   BSA (Calculated - sq m) 1.64 sq meters   BMI (Calculated) 21.5       
Shift assessment complete. See doc flow. Nightly medications given see MAR. Disoriented to time/date and place. Oriented to self and situation. Patient stated it was May 29th 2024 and she thought she was at [facility]. When Said Nurse tried to reorient the patient she became very agitated, angry and argumentative. She was adamant she was correct. Patient c/o 9/10 pain to IV site at [facility], the IV flushed and patent, no swelling. To appease the patient the IV was removed and a new peripheral IV placed to [facility]. Patient denies chest pain at this time. Patient has anxious behaviors, on the call persistently, and can be impulsive. Patient ambulates with a stand-by assist with a cane. No other needs at this time. Bed alarm in place. Call light and bedside table within easy reach.    05/27/24 2050   Vital Signs   Temp 97.4 °F (36.3 °C)   Temp Source Oral   Pulse 76   Heart Rate Source Monitor   Respirations 16   /67   MAP (Calculated) 86   BP Location Right upper arm   BP Method Automatic   Patient Position Semi fowlers   Oxygen Therapy   SpO2 93 %   O2 Device None (Room air)       
Statement Selected

## 2024-07-22 NOTE — ED PROVIDER NOTE - RESPIRATORY [+], MLM
----- Message from Nahun Hastings DO sent at 7/22/2024 12:08 PM CDT -----  Please notify the patient of results.  Heart strength is normal.  No need for any changes at this time. follow-up as planned.   COUGH/EXERTIONAL DYSPNEA COUGH/EXERTIONAL DYSPNEA/SHORTNESS OF BREATH

## 2025-01-09 NOTE — PROVIDER CONTACT NOTE (CHANGE IN STATUS NOTIFICATION) - ACTION/TREATMENT ORDERED:
Continue Regimen: MetroCream 0.75 % topical Apply to whole face bid. nurse called rapid response. code team arrived. SABINA Juarez arrived to room Render In Strict Bullet Format?: No Detail Level: Zone Initiate Treatment: Imiquimod 5 % topical cream packet \\n-Apply to warts 3x weekly at night, cover with band aid.